# Patient Record
Sex: MALE | Race: WHITE | NOT HISPANIC OR LATINO | Employment: OTHER | URBAN - METROPOLITAN AREA
[De-identification: names, ages, dates, MRNs, and addresses within clinical notes are randomized per-mention and may not be internally consistent; named-entity substitution may affect disease eponyms.]

---

## 2021-09-03 ENCOUNTER — APPOINTMENT (EMERGENCY)
Dept: RADIOLOGY | Facility: HOSPITAL | Age: 71
DRG: 871 | End: 2021-09-03
Payer: MEDICARE

## 2021-09-03 ENCOUNTER — HOSPITAL ENCOUNTER (INPATIENT)
Facility: HOSPITAL | Age: 71
LOS: 4 days | Discharge: ANOTHER HOSPITAL | DRG: 871 | End: 2021-09-08
Attending: EMERGENCY MEDICINE | Admitting: INTERNAL MEDICINE
Payer: MEDICARE

## 2021-09-03 DIAGNOSIS — R93.1 ABNORMAL ECHOCARDIOGRAM: ICD-10-CM

## 2021-09-03 DIAGNOSIS — N18.9 CKD (CHRONIC KIDNEY DISEASE): ICD-10-CM

## 2021-09-03 DIAGNOSIS — J96.00 ACUTE RESPIRATORY FAILURE (HCC): Primary | ICD-10-CM

## 2021-09-03 DIAGNOSIS — R68.83 CHILLS: ICD-10-CM

## 2021-09-03 DIAGNOSIS — R50.9 FEVER: ICD-10-CM

## 2021-09-03 DIAGNOSIS — I50.9 CHF (CONGESTIVE HEART FAILURE) (HCC): ICD-10-CM

## 2021-09-03 DIAGNOSIS — R78.81 BACTEREMIA DUE TO GRAM-POSITIVE BACTERIA: ICD-10-CM

## 2021-09-03 DIAGNOSIS — R68.89 RIGORS: ICD-10-CM

## 2021-09-03 DIAGNOSIS — R06.00 DYSPNEA: ICD-10-CM

## 2021-09-03 PROBLEM — N17.9 ACUTE KIDNEY INJURY SUPERIMPOSED ON CHRONIC KIDNEY DISEASE (HCC): Status: ACTIVE | Noted: 2021-09-03

## 2021-09-03 PROBLEM — E87.6 HYPOKALEMIA: Status: ACTIVE | Noted: 2021-09-03

## 2021-09-03 PROBLEM — A41.9 SEPSIS (HCC): Status: ACTIVE | Noted: 2021-09-03

## 2021-09-03 PROBLEM — E11.9 TYPE 2 DIABETES MELLITUS (HCC): Status: ACTIVE | Noted: 2021-09-03

## 2021-09-03 PROBLEM — I10 HYPERTENSION: Status: ACTIVE | Noted: 2021-09-03

## 2021-09-03 PROBLEM — D64.9 ANEMIA: Status: ACTIVE | Noted: 2021-09-03

## 2021-09-03 LAB
ABO GROUP BLD: NORMAL
ABO GROUP BLD: NORMAL
ALBUMIN SERPL BCP-MCNC: 2.6 G/DL (ref 3.5–5)
ALP SERPL-CCNC: 84 U/L (ref 46–116)
ALT SERPL W P-5'-P-CCNC: 36 U/L (ref 12–78)
ANION GAP SERPL CALCULATED.3IONS-SCNC: 8 MMOL/L (ref 4–13)
APTT PPP: 46 SECONDS (ref 23–37)
AST SERPL W P-5'-P-CCNC: 34 U/L (ref 5–45)
BACTERIA UR QL AUTO: NORMAL /HPF
BASOPHILS # BLD AUTO: 0.03 THOUSANDS/ΜL (ref 0–0.1)
BASOPHILS NFR BLD AUTO: 0 % (ref 0–1)
BILIRUB DIRECT SERPL-MCNC: 0.13 MG/DL (ref 0–0.2)
BILIRUB SERPL-MCNC: 0.51 MG/DL (ref 0.2–1)
BILIRUB UR QL STRIP: NEGATIVE
BLD GP AB SCN SERPL QL: NEGATIVE
BUN SERPL-MCNC: 45 MG/DL (ref 5–25)
CALCIUM SERPL-MCNC: 8.4 MG/DL (ref 8.3–10.1)
CHLORIDE SERPL-SCNC: 97 MMOL/L (ref 100–108)
CLARITY UR: CLEAR
CO2 SERPL-SCNC: 31 MMOL/L (ref 21–32)
COLOR UR: YELLOW
CREAT SERPL-MCNC: 2.49 MG/DL (ref 0.6–1.3)
EOSINOPHIL # BLD AUTO: 0.01 THOUSAND/ΜL (ref 0–0.61)
EOSINOPHIL NFR BLD AUTO: 0 % (ref 0–6)
ERYTHROCYTE [DISTWIDTH] IN BLOOD BY AUTOMATED COUNT: 13.4 % (ref 11.6–15.1)
GFR SERPL CREATININE-BSD FRML MDRD: 25 ML/MIN/1.73SQ M
GLUCOSE SERPL-MCNC: 142 MG/DL (ref 65–140)
GLUCOSE SERPL-MCNC: 199 MG/DL (ref 65–140)
GLUCOSE UR STRIP-MCNC: ABNORMAL MG/DL
HCT VFR BLD AUTO: 30.4 % (ref 36.5–49.3)
HGB BLD-MCNC: 9.8 G/DL (ref 12–17)
HGB UR QL STRIP.AUTO: NEGATIVE
IMM GRANULOCYTES # BLD AUTO: 0.06 THOUSAND/UL (ref 0–0.2)
IMM GRANULOCYTES NFR BLD AUTO: 1 % (ref 0–2)
INR PPP: 1.75 (ref 0.84–1.19)
KETONES UR STRIP-MCNC: NEGATIVE MG/DL
LACTATE SERPL-SCNC: 1.4 MMOL/L (ref 0.5–2)
LACTATE SERPL-SCNC: 2.6 MMOL/L (ref 0.5–2)
LEUKOCYTE ESTERASE UR QL STRIP: ABNORMAL
LYMPHOCYTES # BLD AUTO: 0.3 THOUSANDS/ΜL (ref 0.6–4.47)
LYMPHOCYTES NFR BLD AUTO: 3 % (ref 14–44)
MCH RBC QN AUTO: 28.7 PG (ref 26.8–34.3)
MCHC RBC AUTO-ENTMCNC: 32.2 G/DL (ref 31.4–37.4)
MCV RBC AUTO: 89 FL (ref 82–98)
MONOCYTES # BLD AUTO: 0.89 THOUSAND/ΜL (ref 0.17–1.22)
MONOCYTES NFR BLD AUTO: 9 % (ref 4–12)
NEUTROPHILS # BLD AUTO: 8.32 THOUSANDS/ΜL (ref 1.85–7.62)
NEUTS SEG NFR BLD AUTO: 87 % (ref 43–75)
NITRITE UR QL STRIP: NEGATIVE
NON-SQ EPI CELLS URNS QL MICRO: NORMAL /HPF
NRBC BLD AUTO-RTO: 0 /100 WBCS
NT-PROBNP SERPL-MCNC: 3286 PG/ML
PH UR STRIP.AUTO: 5.5 [PH]
PLATELET # BLD AUTO: 182 THOUSANDS/UL (ref 149–390)
PMV BLD AUTO: 8.7 FL (ref 8.9–12.7)
POTASSIUM SERPL-SCNC: 3.1 MMOL/L (ref 3.5–5.3)
PROT SERPL-MCNC: 6.1 G/DL (ref 6.4–8.2)
PROT UR STRIP-MCNC: NEGATIVE MG/DL
PROTHROMBIN TIME: 19.6 SECONDS (ref 11.6–14.5)
RBC # BLD AUTO: 3.41 MILLION/UL (ref 3.88–5.62)
RBC #/AREA URNS AUTO: NORMAL /HPF
RH BLD: POSITIVE
RH BLD: POSITIVE
SARS-COV-2 RNA RESP QL NAA+PROBE: NEGATIVE
SODIUM SERPL-SCNC: 136 MMOL/L (ref 136–145)
SP GR UR STRIP.AUTO: 1.01 (ref 1–1.03)
SPECIMEN EXPIRATION DATE: NORMAL
TROPONIN I SERPL-MCNC: 0.03 NG/ML
UROBILINOGEN UR QL STRIP.AUTO: 0.2 E.U./DL
WBC # BLD AUTO: 9.61 THOUSAND/UL (ref 4.31–10.16)
WBC #/AREA URNS AUTO: NORMAL /HPF

## 2021-09-03 PROCEDURE — 83605 ASSAY OF LACTIC ACID: CPT | Performed by: EMERGENCY MEDICINE

## 2021-09-03 PROCEDURE — 99285 EMERGENCY DEPT VISIT HI MDM: CPT | Performed by: EMERGENCY MEDICINE

## 2021-09-03 PROCEDURE — 83880 ASSAY OF NATRIURETIC PEPTIDE: CPT | Performed by: EMERGENCY MEDICINE

## 2021-09-03 PROCEDURE — 1124F ACP DISCUSS-NO DSCNMKR DOCD: CPT | Performed by: EMERGENCY MEDICINE

## 2021-09-03 PROCEDURE — 99285 EMERGENCY DEPT VISIT HI MDM: CPT

## 2021-09-03 PROCEDURE — 87186 SC STD MICRODIL/AGAR DIL: CPT | Performed by: EMERGENCY MEDICINE

## 2021-09-03 PROCEDURE — 85730 THROMBOPLASTIN TIME PARTIAL: CPT | Performed by: EMERGENCY MEDICINE

## 2021-09-03 PROCEDURE — 93005 ELECTROCARDIOGRAM TRACING: CPT

## 2021-09-03 PROCEDURE — 85025 COMPLETE CBC W/AUTO DIFF WBC: CPT | Performed by: EMERGENCY MEDICINE

## 2021-09-03 PROCEDURE — 87040 BLOOD CULTURE FOR BACTERIA: CPT | Performed by: EMERGENCY MEDICINE

## 2021-09-03 PROCEDURE — 71045 X-RAY EXAM CHEST 1 VIEW: CPT

## 2021-09-03 PROCEDURE — 82948 REAGENT STRIP/BLOOD GLUCOSE: CPT

## 2021-09-03 PROCEDURE — 80076 HEPATIC FUNCTION PANEL: CPT | Performed by: EMERGENCY MEDICINE

## 2021-09-03 PROCEDURE — U0005 INFEC AGEN DETEC AMPLI PROBE: HCPCS | Performed by: EMERGENCY MEDICINE

## 2021-09-03 PROCEDURE — 96365 THER/PROPH/DIAG IV INF INIT: CPT

## 2021-09-03 PROCEDURE — 86900 BLOOD TYPING SEROLOGIC ABO: CPT | Performed by: EMERGENCY MEDICINE

## 2021-09-03 PROCEDURE — 87077 CULTURE AEROBIC IDENTIFY: CPT | Performed by: EMERGENCY MEDICINE

## 2021-09-03 PROCEDURE — 85610 PROTHROMBIN TIME: CPT | Performed by: EMERGENCY MEDICINE

## 2021-09-03 PROCEDURE — 99220 PR INITIAL OBSERVATION CARE/DAY 70 MINUTES: CPT

## 2021-09-03 PROCEDURE — 81001 URINALYSIS AUTO W/SCOPE: CPT | Performed by: EMERGENCY MEDICINE

## 2021-09-03 PROCEDURE — 86850 RBC ANTIBODY SCREEN: CPT | Performed by: EMERGENCY MEDICINE

## 2021-09-03 PROCEDURE — 80048 BASIC METABOLIC PNL TOTAL CA: CPT | Performed by: EMERGENCY MEDICINE

## 2021-09-03 PROCEDURE — 86901 BLOOD TYPING SEROLOGIC RH(D): CPT | Performed by: EMERGENCY MEDICINE

## 2021-09-03 PROCEDURE — U0003 INFECTIOUS AGENT DETECTION BY NUCLEIC ACID (DNA OR RNA); SEVERE ACUTE RESPIRATORY SYNDROME CORONAVIRUS 2 (SARS-COV-2) (CORONAVIRUS DISEASE [COVID-19]), AMPLIFIED PROBE TECHNIQUE, MAKING USE OF HIGH THROUGHPUT TECHNOLOGIES AS DESCRIBED BY CMS-2020-01-R: HCPCS | Performed by: EMERGENCY MEDICINE

## 2021-09-03 PROCEDURE — 84145 PROCALCITONIN (PCT): CPT

## 2021-09-03 PROCEDURE — 36415 COLL VENOUS BLD VENIPUNCTURE: CPT | Performed by: EMERGENCY MEDICINE

## 2021-09-03 PROCEDURE — 84484 ASSAY OF TROPONIN QUANT: CPT | Performed by: EMERGENCY MEDICINE

## 2021-09-03 PROCEDURE — 96367 TX/PROPH/DG ADDL SEQ IV INF: CPT

## 2021-09-03 RX ORDER — ONDANSETRON 2 MG/ML
4 INJECTION INTRAMUSCULAR; INTRAVENOUS EVERY 6 HOURS PRN
Status: DISCONTINUED | OUTPATIENT
Start: 2021-09-03 | End: 2021-09-09 | Stop reason: HOSPADM

## 2021-09-03 RX ORDER — AMLODIPINE BESYLATE 5 MG/1
5 TABLET ORAL DAILY
COMMUNITY
Start: 2021-08-31

## 2021-09-03 RX ORDER — PAROXETINE 10 MG/1
10 TABLET, FILM COATED ORAL
Status: ON HOLD | COMMUNITY
End: 2021-09-08 | Stop reason: SDUPTHER

## 2021-09-03 RX ORDER — AMLODIPINE BESYLATE 5 MG/1
5 TABLET ORAL DAILY
Status: DISCONTINUED | OUTPATIENT
Start: 2021-09-04 | End: 2021-09-09 | Stop reason: HOSPADM

## 2021-09-03 RX ORDER — METOPROLOL SUCCINATE 50 MG/1
50 TABLET, EXTENDED RELEASE ORAL DAILY
Status: DISCONTINUED | OUTPATIENT
Start: 2021-09-04 | End: 2021-09-04

## 2021-09-03 RX ORDER — TORSEMIDE 20 MG/1
40 TABLET ORAL DAILY
COMMUNITY
Start: 2021-08-31 | End: 2021-09-08 | Stop reason: HOSPADM

## 2021-09-03 RX ORDER — POTASSIUM CHLORIDE 20 MEQ/1
40 TABLET, EXTENDED RELEASE ORAL ONCE
Status: COMPLETED | OUTPATIENT
Start: 2021-09-03 | End: 2021-09-03

## 2021-09-03 RX ORDER — FUROSEMIDE 10 MG/ML
40 INJECTION INTRAMUSCULAR; INTRAVENOUS ONCE
Status: DISCONTINUED | OUTPATIENT
Start: 2021-09-03 | End: 2021-09-04

## 2021-09-03 RX ORDER — ACETAMINOPHEN 325 MG/1
650 TABLET ORAL EVERY 6 HOURS PRN
Status: DISCONTINUED | OUTPATIENT
Start: 2021-09-03 | End: 2021-09-09 | Stop reason: HOSPADM

## 2021-09-03 RX ORDER — METOPROLOL SUCCINATE 50 MG/1
50 TABLET, EXTENDED RELEASE ORAL DAILY
COMMUNITY
Start: 2021-06-14 | End: 2021-09-08 | Stop reason: HOSPADM

## 2021-09-03 RX ORDER — PAROXETINE HYDROCHLORIDE 20 MG/1
20 TABLET, FILM COATED ORAL DAILY
Status: DISCONTINUED | OUTPATIENT
Start: 2021-09-04 | End: 2021-09-09 | Stop reason: HOSPADM

## 2021-09-03 RX ADMIN — POTASSIUM CHLORIDE 40 MEQ: 1500 TABLET, EXTENDED RELEASE ORAL at 22:14

## 2021-09-03 RX ADMIN — APIXABAN 5 MG: 5 TABLET, FILM COATED ORAL at 22:14

## 2021-09-03 RX ADMIN — INSULIN LISPRO 2 UNITS: 100 INJECTION, SOLUTION INTRAVENOUS; SUBCUTANEOUS at 22:15

## 2021-09-03 RX ADMIN — VANCOMYCIN HYDROCHLORIDE 1500 MG: 1 INJECTION, POWDER, LYOPHILIZED, FOR SOLUTION INTRAVENOUS at 19:13

## 2021-09-03 RX ADMIN — CEFEPIME HYDROCHLORIDE 2000 MG: 2 INJECTION, POWDER, FOR SOLUTION INTRAVENOUS at 18:03

## 2021-09-03 NOTE — ED PROVIDER NOTES
History  Chief Complaint   Patient presents with    Shortness of Breath     As per EMS "patient c/o sob, weakness, fever, reports hospitalization 2 wks ago for CHF exacerbation"     74 yo male with h/o CHF and chronic systolic heart murmur who presents to the ED for evaluation of cough, weakness, chills and fever of 101 earlier today  Associated dyspnea  Denies chest pain  Denies abd pain  Denies urinary sxs  Denies HA and neck pain  Denies rash  Denies LE pain or swelling  On eliquis and torsemide  PMH also significant for DM and CKD  Fully vaccinated against covid  Prior to Admission Medications   Prescriptions Last Dose Informant Patient Reported? Taking? PARoxetine (PAXIL) 10 mg tablet   Yes No   Sig: Take 10 mg by mouth   amLODIPine (NORVASC) 5 mg tablet   Yes Yes   Sig: Take 5 mg by mouth daily   apixaban (ELIQUIS) 5 mg   Yes No   Sig: Take 5 mg by mouth 2 (two) times a day   metoprolol succinate (TOPROL-XL) 50 mg 24 hr tablet   Yes Yes   Sig: Take 50 mg by mouth daily   torsemide (DEMADEX) 20 mg tablet   Yes Yes   Sig: Take 40 mg by mouth daily      Facility-Administered Medications: None       Past Medical History:   Diagnosis Date    [facility]F (congestive heart failure) ([facility] [address] )     Diabetes mellitus ([facility] )     Hypertension        No past surgical history on file  No family history on file  I have reviewed and agree with the history as documented  E-Cigarette/Vaping    E-Cigarette Use Never User      E-Cigarette/Vaping Substances     Social History     Tobacco Use    Smoking status: Former Smoker     Types: Cigarettes   Vaping Use    Vaping Use: Never used   Substance Use Topics    Alcohol use: Not Currently    Drug use: Not on file       Review of Systems   Constitutional: Positive for chills and fever  Respiratory: Positive for shortness of breath  Neurological: Positive for weakness  All other systems reviewed and are negative        Physical Exam  Physical Exam  Vitals and nursing note reviewed  Constitutional:       General: He is not in acute distress  Appearance: Normal appearance  He is well-developed  He is not ill-appearing, toxic-appearing or diaphoretic  HENT:      Head: Normocephalic and atraumatic  Nose: Nose normal  No congestion  Eyes:      Conjunctiva/sclera: Conjunctivae normal       Pupils: Pupils are equal, round, and reactive to light  Neck:      Vascular: No JVD  Cardiovascular:      Rate and Rhythm: Normal rate and regular rhythm  Pulses: Normal pulses  Heart sounds: Murmur heard  No friction rub  No gallop  Pulmonary:      Effort: Pulmonary effort is normal  No respiratory distress  Breath sounds: Normal breath sounds  No stridor  No wheezing, rhonchi or rales  Abdominal:      General: There is no distension  Palpations: Abdomen is soft  Tenderness: There is no abdominal tenderness  Musculoskeletal:         General: No swelling, tenderness, deformity or signs of injury  Normal range of motion  Cervical back: Normal range of motion and neck supple  No rigidity or tenderness  Right lower leg: No edema  Left lower leg: No edema  Skin:     General: Skin is warm and dry  Capillary Refill: Capillary refill takes less than 2 seconds  Coloration: Skin is not jaundiced or pale  Findings: No bruising, erythema, lesion or rash  Neurological:      General: No focal deficit present  Mental Status: He is alert and oriented to person, place, and time  Cranial Nerves: No cranial nerve deficit  Sensory: No sensory deficit  Motor: No weakness or abnormal muscle tone        Coordination: Coordination normal          Vital Signs  ED Triage Vitals [09/03/21 1717]   Temperature Pulse Respirations Blood Pressure SpO2   99 4 °F (37 4 °C) 75 (!) 23 120/59 100 %      Temp Source Heart Rate Source Patient Position - Orthostatic VS BP Location FiO2 (%)   Oral Monitor -- Right arm -- Pain Score       --           Vitals:    09/03/21 1717 09/03/21 1800 09/03/21 1900   BP: 120/59 102/51 115/55   Pulse: 75 69 69         Visual Acuity      ED Medications  Medications   vancomycin (VANCOCIN) 1500 mg in sodium chloride 0 9% 250 mL IVPB (1,500 mg Intravenous New Bag 9/3/21 1913)   cefepime (MAXIPIME) 2 g/50 mL dextrose IVPB (0 mg Intravenous Stopped 9/3/21 1833)       Diagnostic Studies  Results Reviewed     Procedure Component Value Units Date/Time    Urine Microscopic [[id number]]  (Normal) Collected: 09/03/21 1913    Lab Status: Final result Specimen: Urine, Clean Catch Updated: 09/03/21 1939     RBC, UA 0-1 /hpf      WBC, UA 0-1 /hpf      Epithelial Cells None Seen /hpf      Bacteria, UA None Seen /hpf     UA (URINE) with reflex to Scope [[id number]]  (Abnormal) Collected: 09/03/21 1913    Lab Status: Final result Specimen: Urine, Clean Catch Updated: 09/03/21 1929     Color, UA Yellow     Clarity, UA Clear     Specific Gravity, UA 1 015     pH, UA 5 5     Leukocytes, UA Elevated glucose may cause decreased leukocyte values  See urine microscopic for [facility] result/     Nitrite, UA Negative     Protein, UA Negative mg/dl      Glucose, UA >=1000 (1%) mg/dl      Ketones, UA Negative mg/dl      Urobilinogen, UA 0 2 E U /dl      Bilirubin, UA Negative     Blood, UA Negative    Novel Coronavirus (Covid-19),PCR [facility] - 2 Hour Stat [[id number]]  (Normal) Collected: 09/03/21 1746    Lab Status: Final result Specimen: Nares from Nasopharyngeal Swab Updated: 09/03/21 1850     SARS-CoV-2 Negative    Narrative: The specimen collection materials, transport medium, and/or testing methodology utilized in the production of these test results have been proven to be reliable in a limited validation with an abbreviated program under the Emergency Utilization Authorization provided by the FDA  Testing reported as "Presumptive positive" will be confirmed with secondary testing to ensure result accuracy    Clinical caution and judgement should be used with the interpretation of these results with consideration of the clinical impression and other laboratory testing  Testing reported as "Positive" or "Negative" has been proven to be accurate according to standard laboratory validation requirements  All testing is performed with control materials showing appropriate reactivity at standard intervals  Lactic acid [[id number]]  (Abnormal) Collected: 09/03/21 1741    Lab Status: Final result Specimen: Blood from Arm, Left Updated: 09/03/21 1827     LACTIC ACID 2 6 mmol/L     Narrative:      Result may be elevated if tourniquet was used during collection      Lactic acid 2 Hours [[id number]]     Lab Status: No result Specimen: Blood     NT-BNP PRO [[id number]]  (Abnormal) Collected: 09/03/21 1741    Lab Status: Final result Specimen: Blood from Arm, Left Updated: 09/03/21 1823     NT-proBNP 3,286 pg/mL     Basic metabolic panel [[id number]]  (Abnormal) Collected: 09/03/21 1741    Lab Status: Final result Specimen: Blood from Arm, Left Updated: 09/03/21 1823     Sodium 136 mmol/L      Potassium 3 1 mmol/L      Chloride 97 mmol/L      CO2 31 mmol/L      ANION GAP 8 mmol/L      BUN 45 mg/dL      Creatinine 2 49 mg/dL      Glucose 142 mg/dL      Calcium 8 4 mg/dL      eGFR 25 ml/min/1 73sq m     Narrative:      [place] guidelines for Chronic Kidney Disease (CKD):     Stage 1 with normal or high GFR (GFR > 90 mL/min/1 73 square meters)    Stage 2 Mild CKD (GFR = 60-89 mL/min/1 73 square meters)    Stage 3A Moderate CKD (GFR = 45-59 mL/min/1 73 square meters)    Stage 3B Moderate CKD (GFR = 30-44 mL/min/1 73 square meters)    Stage 4 Severe CKD (GFR = 15-29 mL/min/1 73 square meters)    Stage 5 End Stage CKD (GFR <15 mL/min/1 73 square meters)  Note: GFR calculation is accurate only with a steady state creatinine    Hepatic function panel [[id number]]  (Abnormal) Collected: 09/03/21 1741    Lab Status: Final result Specimen: Blood from Arm, Left Updated: 09/03/21 1823     Total Bilirubin 0 51 mg/dL      Bilirubin, Direct 0 13 mg/dL      Alkaline Phosphatase 84 U/L      AST 34 U/L      ALT 36 U/L      Total Protein 6 1 g/dL      Albumin 2 6 g/dL     Protime-INR [[id number]]  (Abnormal) Collected: 09/03/21 1741    Lab Status: Final result Specimen: Blood from Arm, Left Updated: 09/03/21 1822     Protime 19 6 seconds      INR 1 75    APTT [[id number]]  (Abnormal) Collected: 09/03/21 1741    Lab Status: Final result Specimen: Blood from Arm, Left Updated: 09/03/21 1822     PTT 46 seconds     Troponin I [[id number]]  (Normal) Collected: 09/03/21 1741    Lab Status: Final result Specimen: Blood from Arm, Left Updated: 09/03/21 1811     Troponin I 0 03 ng/mL     CBC and differential [[id number]]  (Abnormal) Collected: 09/03/21 1741    Lab Status: Final result Specimen: Blood from Arm, Left Updated: 09/03/21 1753     WBC 9 61 Thousand/uL      RBC 3 41 Million/uL      Hemoglobin 9 8 g/dL      Hematocrit 30 4 %      MCV 89 fL      MCH 28 7 pg      MCHC 32 2 g/dL      RDW 13 4 %      MPV 8 7 fL      Platelets 427 Thousands/uL      nRBC 0 /100 WBCs      Neutrophils Relative 87 %      Immat GRANS % 1 %      Lymphocytes Relative 3 %      Monocytes Relative 9 %      Eosinophils Relative 0 %      Basophils Relative 0 %      Neutrophils Absolute 8 32 Thousands/µL      Immature Grans Absolute 0 06 Thousand/uL      Lymphocytes Absolute 0 30 Thousands/µL      Monocytes Absolute 0 89 Thousand/µL      Eosinophils Absolute 0 01 Thousand/µL      Basophils Absolute 0 03 Thousands/µL     Blood culture #2 [[id number]] Collected: 09/03/21 1741    Lab Status: In process Specimen: Blood from Arm, Left Updated: 09/03/21 1749    Blood culture #1 [[id number]] Collected: 09/03/21 1741    Lab Status:  In process Specimen: Blood from Arm, Right Updated: 09/03/21 1749                 XR chest 1 view portable    (Results Pending) Procedures  Procedures         ED Course  ED Course as of Sep 03 2003   Fri Sep 03, 2021   1842 Creat baseline based on prior bmp from OSH available in epic                                 SBIRT 20yo+      Most Recent Value   SBIRT (25 yo +)   In order to provide better care to our patients, we are screening all of our patients for alcohol and drug use  Would it be okay to ask you these screening questions? Yes Filed at: 09/03/2021 1754   Initial Alcohol Screen: US AUDIT-C    1  How often do you have a drink containing alcohol?  0 Filed at: 09/03/2021 1754   2  How many drinks containing alcohol do you have on a typical day you are drinking? 0 Filed at: 09/03/2021 1754   3a  Male UNDER 65: How often do you have five or more drinks on one occasion? 0 Filed at: 09/03/2021 1754   Audit-C Score  0 Filed at: 09/03/2021 1754   DAVIDE: How many times in the past year have you    Used an illegal drug or used a prescription medication for non-medical reasons?   Never Filed at: 09/03/2021 1754                    MDM  Number of Diagnoses or Management Options     Amount and/or Complexity of Data Reviewed  Clinical lab tests: reviewed and ordered  Tests in the radiology section of CPT®: reviewed and ordered  Tests in the medicine section of CPT®: ordered and reviewed  Independent visualization of images, tracings, or specimens: yes        Disposition  Final diagnoses:   Dyspnea   Fever   Chills   Rigors   CKD (chronic kidney disease)   CHF (congestive heart failure) ([facility] [address] )     Time reflects when diagnosis was documented in both MDM as applicable and the Disposition within this note     Time User Action Codes Description Comment    9/3/2021  7:56 PM Elnita Rather Add [R06 00] Dyspnea     9/3/2021  7:56 PM Elnita Rather Add [R50 9] Fever     9/3/2021  7:56 PM Eliverto Speed [R68 83] Chills     9/3/2021  7:56 PM Eliverto Speed [R68 89] Rigors     9/3/2021  7:56 PM Duffy, Sada Aver Add [N18 9] CKD (chronic kidney disease) 9/3/2021  7:56 PM Yunior Duffy Add [I50 9] CHF (congestive heart failure) [facility])       ED Disposition     ED Disposition Condition Date/Time Comment    Admit Stable Fri Sep 3, 2021  7:56 PM Case was discussed with Ailyn Lazaro and the patient's admission status was agreed to be Admission Status: observation status to the service of Dr Alpa Livingston   Follow-up Information    None         Patient's Medications   Discharge Prescriptions    No medications on file     No discharge procedures on file      PDMP Review     None          ED Provider  Electronically Signed by           Myrl Jeans, MD  09/03/21 2003

## 2021-09-03 NOTE — ED NOTES
[facility] [address] [facility]  09/03/21 6983

## 2021-09-04 ENCOUNTER — APPOINTMENT (OUTPATIENT)
Dept: RADIOLOGY | Facility: HOSPITAL | Age: 71
DRG: 871 | End: 2021-09-04
Payer: MEDICARE

## 2021-09-04 LAB
ANION GAP SERPL CALCULATED.3IONS-SCNC: 6 MMOL/L (ref 4–13)
BASOPHILS # BLD MANUAL: 0 THOUSAND/UL (ref 0–0.1)
BASOPHILS NFR MAR MANUAL: 0 % (ref 0–1)
BUN SERPL-MCNC: 44 MG/DL (ref 5–25)
CALCIUM SERPL-MCNC: 7.9 MG/DL (ref 8.3–10.1)
CHLORIDE SERPL-SCNC: 97 MMOL/L (ref 100–108)
CO2 SERPL-SCNC: 30 MMOL/L (ref 21–32)
CREAT SERPL-MCNC: 2.46 MG/DL (ref 0.6–1.3)
EOSINOPHIL # BLD MANUAL: 0 THOUSAND/UL (ref 0–0.4)
EOSINOPHIL NFR BLD MANUAL: 0 % (ref 0–6)
ERYTHROCYTE [DISTWIDTH] IN BLOOD BY AUTOMATED COUNT: 13.5 % (ref 11.6–15.1)
GFR SERPL CREATININE-BSD FRML MDRD: 26 ML/MIN/1.73SQ M
GLUCOSE P FAST SERPL-MCNC: 228 MG/DL (ref 65–99)
GLUCOSE SERPL-MCNC: 228 MG/DL (ref 65–140)
GLUCOSE SERPL-MCNC: 244 MG/DL (ref 65–140)
GLUCOSE SERPL-MCNC: 261 MG/DL (ref 65–140)
GLUCOSE SERPL-MCNC: 336 MG/DL (ref 65–140)
GLUCOSE SERPL-MCNC: 369 MG/DL (ref 65–140)
HCT VFR BLD AUTO: 28.3 % (ref 36.5–49.3)
HGB BLD-MCNC: 9.3 G/DL (ref 12–17)
LYMPHOCYTES # BLD AUTO: 0.3 THOUSAND/UL (ref 0.6–4.47)
LYMPHOCYTES # BLD AUTO: 3 % (ref 14–44)
MCH RBC QN AUTO: 29.3 PG (ref 26.8–34.3)
MCHC RBC AUTO-ENTMCNC: 32.9 G/DL (ref 31.4–37.4)
MCV RBC AUTO: 89 FL (ref 82–98)
MONOCYTES # BLD AUTO: 0.51 THOUSAND/UL (ref 0–1.22)
MONOCYTES NFR BLD: 5 % (ref 4–12)
NEUTROPHILS # BLD MANUAL: 9.34 THOUSAND/UL (ref 1.85–7.62)
NEUTS BAND NFR BLD MANUAL: 2 % (ref 0–8)
NEUTS SEG NFR BLD AUTO: 90 % (ref 43–75)
PLATELET # BLD AUTO: 160 THOUSANDS/UL (ref 149–390)
PLATELET BLD QL SMEAR: ADEQUATE
PMV BLD AUTO: 8.7 FL (ref 8.9–12.7)
POTASSIUM SERPL-SCNC: 3.7 MMOL/L (ref 3.5–5.3)
PROCALCITONIN SERPL-MCNC: 2.25 NG/ML
RBC # BLD AUTO: 3.17 MILLION/UL (ref 3.88–5.62)
SODIUM SERPL-SCNC: 133 MMOL/L (ref 136–145)
WBC # BLD AUTO: 10.15 THOUSAND/UL (ref 4.31–10.16)

## 2021-09-04 PROCEDURE — 85007 BL SMEAR W/DIFF WBC COUNT: CPT

## 2021-09-04 PROCEDURE — 85027 COMPLETE CBC AUTOMATED: CPT

## 2021-09-04 PROCEDURE — 87081 CULTURE SCREEN ONLY: CPT | Performed by: INTERNAL MEDICINE

## 2021-09-04 PROCEDURE — 80048 BASIC METABOLIC PNL TOTAL CA: CPT

## 2021-09-04 PROCEDURE — 71046 X-RAY EXAM CHEST 2 VIEWS: CPT

## 2021-09-04 PROCEDURE — 99232 SBSQ HOSP IP/OBS MODERATE 35: CPT | Performed by: INTERNAL MEDICINE

## 2021-09-04 PROCEDURE — 82948 REAGENT STRIP/BLOOD GLUCOSE: CPT

## 2021-09-04 RX ORDER — AMOXICILLIN 250 MG
1 CAPSULE ORAL
Status: DISCONTINUED | OUTPATIENT
Start: 2021-09-04 | End: 2021-09-09 | Stop reason: HOSPADM

## 2021-09-04 RX ORDER — FUROSEMIDE 10 MG/ML
40 INJECTION INTRAMUSCULAR; INTRAVENOUS
Status: DISCONTINUED | OUTPATIENT
Start: 2021-09-04 | End: 2021-09-07

## 2021-09-04 RX ORDER — INSULIN GLARGINE 100 [IU]/ML
25 INJECTION, SOLUTION SUBCUTANEOUS
Status: DISCONTINUED | OUTPATIENT
Start: 2021-09-04 | End: 2021-09-05

## 2021-09-04 RX ADMIN — FUROSEMIDE 40 MG: 10 INJECTION, SOLUTION INTRAMUSCULAR; INTRAVENOUS at 08:55

## 2021-09-04 RX ADMIN — DOCUSATE SODIUM AND SENNOSIDES 1 TABLET: 8.6; 5 TABLET, FILM COATED ORAL at 16:29

## 2021-09-04 RX ADMIN — CEFEPIME HYDROCHLORIDE 1000 MG: 2 INJECTION, POWDER, FOR SOLUTION INTRAVENOUS at 05:56

## 2021-09-04 RX ADMIN — INSULIN GLARGINE 25 UNITS: 100 INJECTION, SOLUTION SUBCUTANEOUS at 21:32

## 2021-09-04 RX ADMIN — INSULIN LISPRO 5 UNITS: 100 INJECTION, SOLUTION INTRAVENOUS; SUBCUTANEOUS at 11:42

## 2021-09-04 RX ADMIN — Medication 12.5 MG: at 21:31

## 2021-09-04 RX ADMIN — Medication 12.5 MG: at 11:42

## 2021-09-04 RX ADMIN — INSULIN LISPRO 20 UNITS: 100 INJECTION, SOLUTION INTRAVENOUS; SUBCUTANEOUS at 16:40

## 2021-09-04 RX ADMIN — CEFEPIME HYDROCHLORIDE 1000 MG: 2 INJECTION, POWDER, FOR SOLUTION INTRAVENOUS at 18:21

## 2021-09-04 RX ADMIN — INSULIN LISPRO 6 UNITS: 100 INJECTION, SOLUTION INTRAVENOUS; SUBCUTANEOUS at 16:39

## 2021-09-04 RX ADMIN — PAROXETINE 20 MG: 20 TABLET, FILM COATED ORAL at 08:54

## 2021-09-04 RX ADMIN — VANCOMYCIN HYDROCHLORIDE 1500 MG: 1 INJECTION, POWDER, LYOPHILIZED, FOR SOLUTION INTRAVENOUS at 19:11

## 2021-09-04 RX ADMIN — INSULIN HUMAN 10 UNITS: 100 INJECTION, SOLUTION PARENTERAL at 16:38

## 2021-09-04 RX ADMIN — INSULIN LISPRO 3 UNITS: 100 INJECTION, SOLUTION INTRAVENOUS; SUBCUTANEOUS at 22:30

## 2021-09-04 RX ADMIN — DOCUSATE SODIUM AND SENNOSIDES 1 TABLET: 8.6; 5 TABLET, FILM COATED ORAL at 21:31

## 2021-09-04 RX ADMIN — APIXABAN 5 MG: 5 TABLET, FILM COATED ORAL at 18:21

## 2021-09-04 RX ADMIN — AMLODIPINE BESYLATE 5 MG: 5 TABLET ORAL at 08:55

## 2021-09-04 RX ADMIN — APIXABAN 5 MG: 5 TABLET, FILM COATED ORAL at 08:55

## 2021-09-04 RX ADMIN — INSULIN LISPRO 3 UNITS: 100 INJECTION, SOLUTION INTRAVENOUS; SUBCUTANEOUS at 07:45

## 2021-09-04 RX ADMIN — FUROSEMIDE 40 MG: 10 INJECTION, SOLUTION INTRAMUSCULAR; INTRAVENOUS at 16:29

## 2021-09-04 NOTE — ASSESSMENT & PLAN NOTE
No results found for: HGBA1C    No results for input(s): POCGLU in the last 72 hours      · Blood glucose 142 on admission  · A1c 7 5 on 8/21  · Patient not prescribed outpatient diabetic medications  · Mealtime SSI ordered  · Monitor blood glucose   · Hypoglycemia protocol  · Outpatient PCP follow up

## 2021-09-04 NOTE — PROGRESS NOTES
Vancomycin Assessment    Hudson Pfeiffer is a 73 y o  male who is currently receiving vancomycin 1500 mg iv once (given) the 1500 mg iv q 24 hours for Pneumonia     Relevant clinical data and objective history reviewed:  Creatinine   Date Value Ref Range Status   09/03/2021 2 49 (H) 0 60 - 1 30 mg/dL Final     Comment:     Standardized to IDMS reference method     BP (!) 109/49   Pulse 76   Temp 100 °F (37 8 °C)   Resp 20   Ht 5' 5" (1 651 m)   Wt 103 kg (227 lb 4 7 oz)   SpO2 94%   BMI 37 82 kg/m²   No intake/output data recorded  Lab Results   Component Value Date/Time    BUN 45 (H) 09/03/2021 05:41 PM    WBC 9 61 09/03/2021 05:41 PM    HGB 9 8 (L) 09/03/2021 05:41 PM    HCT 30 4 (L) 09/03/2021 05:41 PM    MCV 89 09/03/2021 05:41 PM     09/03/2021 05:41 PM     Temp Readings from Last 3 Encounters:   09/03/21 100 °F (37 8 °C)     Vancomycin Days of Therapy: 1    Assessment/Plan  The patient is currently on vancomycin utilizing scheduled dosing based on adjusted body weight (due to obesity)  Baseline risks associated with therapy include: pre-existing renal impairment, concomitant nephrotoxic medications, advanced age, and dehydration  The patient is currently receiving 1500 mg iv once (given) the 1500 mg iv q 24 hours and after clinical evaluation will be changed to    Pharmacy will also follow closely for s/sx of nephrotoxicity, infusion reactions, and appropriateness of therapy  BMP and CBC will be ordered per protocol  Plan for trough as patient approaches steady state, prior to the 4th  dose at approximately 17:30 on 9/06/2021  Due to infection severity, will target a trough of 15-20 (appropriate for most indications)   Pharmacy will continue to follow the patients culture results and clinical progress daily      Victor Manuel Castano, Pharmacist

## 2021-09-04 NOTE — ASSESSMENT & PLAN NOTE
No results found for: HGBA1C    Recent Labs     09/03/21  2203 09/04/21  0705 09/04/21  1051 09/04/21  1624   POCGLU 199* 244* 336* 369*     (P) 287  · Blood glucose 142 on admission  · A1c 7 5 on 8/21  · Patient not prescribed outpatient diabetic medications  · Mealtime SSI ordered  · Monitor blood glucose   · Hypoglycemia protocol  · Outpatient PCP follow up      9/4/21: reviewed home meds with family, patient is on Lantus 25 units b i d   And he is also on mealtime insulin 25-30 units depending on his blood sugar    -continue Lantus 25 b i d   Mealtime on 25 units  -sliding scale

## 2021-09-04 NOTE — ASSESSMENT & PLAN NOTE
Patient reporting development of cough, weakness, chills, dyspnea, decreased appetite, nausea without vomiting, and fever of 101F at home today  No recent sick contacts, patient is fully vaccinated against COVID  · Sepsis criteria;  Fever 101F measured at home, resp 24cpm in ED; chills, rigors, and SOB at home plus CBC left shift suggestive of possible infectious lung process superimposed on volume overload  · CXR BL lower lobe infiltrates   · 96% O2 1L NC, /49; monitor  · Lactic acid 2 6  · Troponin WNL  · Infectious workup and abx ordered in ED  · Procalcitonin ordered   · Continue cefepime and vancomycin  · Given suggestive acute CHF exacerbation, stable BP, with lactic acid less than 4; holding IV fluids in setting of acute CHF  · Monitor BP and consider IVF infusion if needed for hypotension

## 2021-09-04 NOTE — ASSESSMENT & PLAN NOTE
Wt Readings from Last 3 Encounters:   09/04/21 103 kg (227 lb 1 2 oz)       · CXR BL lower lobe infiltrates and BNP 3286 suggestive of volume overload  · Echo ordered   · 40mg IV Lasix ordered  · I/O's / daily weights  · Reevaluate need for further diuretics     9/4/21:  Patient with crackles at lung bases  Will continue IV Lasix 40 mg b i d   And assess response   -echo pending  -repeat cxr pa and lateral pending

## 2021-09-04 NOTE — ASSESSMENT & PLAN NOTE
· HGB 9 8; type and screen obtained in ED  · No signs of active bleeding; likely related to CKD  · Continue to monitor for s/s of active bleeding  · Transfuse if necessary   · AM CBC ordered

## 2021-09-04 NOTE — RESPIRATORY THERAPY NOTE
RT Protocol Note  Syed Gill 73 y o  male MRN: [id number]  Unit/Bed#: -01 Encounter: [id number]    Assessment    Principal Problem:    Sepsis ([facility] [address] )  Active Problems:    Anemia    Type 2 diabetes mellitus (HCC)    Acute CHF (congestive heart failure) (HCC)    Hypertension    Acute kidney injury superimposed on chronic kidney disease (HCC)    Hypokalemia      Home Pulmonary Medications:  none       Past Medical History:   Diagnosis Date    CHF (congestive heart failure) (HCC)     Diabetes mellitus ([facility] [address] )     Hypertension      Social History     Socioeconomic History    Marital status: /Civil Union     Spouse name: Not on file    Number of children: Not on file    Years of education: Not on file    Highest education level: Not on file   Occupational History    Not on file   Tobacco Use    Smoking status: Former Smoker     Types: Cigarettes   Vaping Use    Vaping Use: Never used   Substance and Sexual Activity    Alcohol use: Not Currently    Drug use: Not on file    Sexual activity: Not on file   Other Topics Concern    Not on file   Social History Narrative    Not on file     Social Determinants of Health     Financial Resource Strain:     Difficulty of Paying Living Expenses:    Food Insecurity:     Worried About Running Out of Food in the Last Year:     [address] in the Last Year:    Transportation Needs:     Lack of Transportation (Medical):      Lack of Transportation (Non-Medical):    Physical Activity:     Days of Exercise per Week:     Minutes of Exercise per Session:    Stress:     Feeling of Stress :    Social Connections:     Frequency of Communication with Friends and Family:     Frequency of Social Gatherings with Friends and Family:     Attends [employer] Services:     Active Member of Clubs or Organizations:     Attends Club or Organization Meetings:     Marital Status:    Intimate Partner Violence:     Fear of Current or Ex-Partner:     Emotionally Abused:     Physically Abused:     Sexually Abused:        Subjective         Objective    Physical Exam:   Assessment Type: Assess only  General Appearance: Drowsy  Respiratory Pattern: Spontaneous, Tachypneic  Chest Assessment: Chest expansion symmetrical  Bilateral Breath Sounds: Diminished  O2 Device: nasal cannula    Vitals:  Blood pressure (!) 127/46, pulse 69, temperature 99 3 °F (37 4 °C), resp  rate (!) 24, height 5' 5" (1 651 m), weight 103 kg (227 lb 4 7 oz), SpO2 93 %            Imaging and other studies:     O2 Device: nasal cannula     Plan    Respiratory Plan: No distress/Pulmonary history, found patient resting quietly in bed, bilat diminished bs although an UAW/audible whz is noted, HR 69 RR 24 Spo2 93% on 2L o2 nasal cannula, patient has no respiratory history nor home respiratory medications, no additional therapy indicated at this time

## 2021-09-04 NOTE — ASSESSMENT & PLAN NOTE
Lab Results   Component Value Date    EGFR 26 09/04/2021    EGFR 25 09/03/2021    CREATININE 2 46 (H) 09/04/2021    CREATININE 2 49 (H) 09/03/2021     · Creatinine on admission 2 49; baseline around 2 10  · Likely in setting of CHF volume overload vs prerenal SEPSIS  · Giving 40mg IV lasix; Monitor BP, AM BMP ordered, evaluate in AM for need of further diuretic vs IVF administration      9/4/21: kidney function is stable, likely component of cardiorenal, will continue to follow

## 2021-09-04 NOTE — ASSESSMENT & PLAN NOTE
· HGB 9 8; type and screen obtained in ED  · No signs of active bleeding; likely related to CKD  · Continue to monitor for s/s of active bleeding  · Transfuse if necessary   · AM CBC ordered     9/4/21: hgb relatively stable at 9 3, patient did receive fluids as well

## 2021-09-04 NOTE — H&P
[address] [facility]  H&P- Poornima Garibay 1950, 73 y o  male MRN: [id number]  Unit/Bed#: -Trav Encounter: [id number]  Primary Care Provider: No primary care provider on file  Date and time admitted to hospital: 9/3/2021  5:14 PM    * Sepsis [facility])  Assessment & Plan  Patient reporting development of cough, weakness, chills, dyspnea, decreased appetite, nausea without vomiting, and fever of 101F at home today  No recent sick contacts, patient is fully vaccinated against COVID  · Sepsis criteria; Fever 101F measured at home, resp 24cpm in ED; chills, rigors, and SOB at home plus CBC left shift suggestive of possible infectious lung process superimposed on volume overload  · CXR BL lower lobe infiltrates   · 96% O2 1L NC, /49; monitor  · Lactic acid 2 6  · Troponin WNL  · Infectious workup and abx ordered in ED  · Procalcitonin ordered   · Continue cefepime and vancomycin  · Given suggestive acute CHF exacerbation, stable BP, with lactic acid less than 4; holding IV fluids in setting of acute CHF  · Monitor BP and consider IVF infusion if needed for hypotension    Acute CHF (congestive heart failure) ([facility] [address] )  Assessment & Plan  Wt Readings from Last 3 Encounters:   09/03/21 103 kg (227 lb 4 7 oz)       · CXR BL lower lobe infiltrates and BNP 3286 suggestive of volume overload  · Echo ordered   · 40mg IV Lasix ordered  · I/O's / daily weights  · Reevaluate need for further diuretics         Acute kidney injury superimposed on chronic kidney disease ([facility] [address] )  Assessment & Plan  Lab Results   Component Value Date    EGFR 25 09/03/2021    CREATININE 2 49 (H) 09/03/2021     · Creatinine on admission 2 49; baseline around 2 10  · Likely in setting of CHF volume overload vs prerenal SEPSIS  · Giving 40mg IV lasix;  Monitor BP, AM BMP ordered, evaluate in AM for need of further diuretic vs IVF administration    Anemia  Assessment & Plan  · HGB 9 8; type and screen obtained in ED  · No signs of active bleeding; likely related to CKD  · Continue to monitor for s/s of active bleeding  · Transfuse if necessary   · AM CBC ordered     Hypokalemia  Assessment & Plan  · Potassium 3 1  · 40meq PO given  · Consider tele monitoring of patient drops below 3 0  · Recheck BMP in AM and replete as needed    Type 2 diabetes mellitus ([facility] )  Assessment & Plan  No results found for: HGBA1C    No results for input(s): POCGLU in the last 72 hours  · Blood glucose 142 on admission  · A1c 7 5 on 8/21  · Patient not prescribed outpatient diabetic medications  · Mealtime SSI ordered  · Monitor blood glucose   · Hypoglycemia protocol  · Outpatient PCP follow up    Hypertension  Assessment & Plan  · Latest /49  · Continue home HTN medications  · Holding IVF in setting of acute CHF; consider administration if hypotensive and lungs are clear  · Monitor BP in setting of SEPSIS      VTE Pharmacologic Prophylaxis: VTE Score: 6 High Risk (Score >/= 5) - Pharmacological DVT Prophylaxis Ordered: heparin  Sequential Compression Devices Ordered  Code Status: Level 1 - Full Code   Discussion with family: Update contact in the AM      Anticipated Length of Stay: Patient will be admitted on an observation basis with an anticipated length of stay of less than 2 midnights secondary to SEPSIS, acute CHF exacerbation, and hypokalemia   Total Time for Visit, including Counseling / Coordination of Care: 70 minutes Greater than 50% of this total time spent on direct patient counseling and coordination of care  Chief Complaint: cough, difficulty breathing, fever, chills, and fatigue at home today    History of Present Illness:  Poornima Garibay is a 73 y o  male with a PMH of CHF, type 2 DM, HTN, and CKD who presents with cough, difficulty breathing, fever, chills, and fatigue at home today  Patient reporting development of cough, weakness, chills, decreased appetite, nausea without vomiting, and fever of 101F at home today   No recent sick contacts, patient is fully vaccinated against COVID  All questions answered at the bedside to the patient's satisfactions  Review of Systems:  Review of Systems   Constitutional: Positive for activity change, appetite change, chills, fatigue and fever  Negative for diaphoresis  HENT: Negative for congestion, ear pain, nosebleeds and trouble swallowing  Eyes: Negative for pain and visual disturbance  Respiratory: Positive for cough and shortness of breath  Negative for apnea, chest tightness and wheezing  Cardiovascular: Positive for leg swelling  Negative for chest pain and palpitations  Gastrointestinal: Positive for nausea  Negative for abdominal distention, abdominal pain, blood in stool, constipation, diarrhea and vomiting  Endocrine: Negative for cold intolerance, heat intolerance and polyuria  Genitourinary: Negative for difficulty urinating, dysuria, flank pain and hematuria  Musculoskeletal: Negative for arthralgias, neck pain and neck stiffness  Skin: Negative for color change, rash and wound  Neurological: Positive for weakness  Negative for dizziness, tremors, syncope, light-headedness, numbness and headaches  Hematological: Negative for adenopathy  All other systems reviewed and are negative  Past Medical and Surgical History:   Past Medical History:   Diagnosis Date    CHF (congestive heart failure) ([facility] )     Diabetes mellitus ([facility] )     Hypertension        No past surgical history on file  Meds/Allergies:  Prior to Admission medications    Medication Sig Start Date End Date Taking?  Authorizing Provider   amLODIPine (NORVASC) 5 mg tablet Take 5 mg by mouth daily 8/31/21  Yes Historical Provider, MD   metoprolol succinate (TOPROL-XL) 50 mg 24 hr tablet Take 50 mg by mouth daily 6/14/21  Yes Historical Provider, MD   torsemide (DEMADEX) 20 mg tablet Take 40 mg by mouth daily 8/31/21  Yes Historical Provider, MD   apixaban (ELIQUIS) 5 mg Take 5 mg by mouth 2 (two) times a day    Historical Provider, MD   PARoxetine (PAXIL) 10 mg tablet Take 10 mg by mouth    Historical Provider, MD     I have reviewed home medications with patient personally  Allergies: Allergies   Allergen Reactions    Nitroglycerin Other (See Comments)     Patient states "he is unsure of reaction"    Statins Other (See Comments)     Patient states "he is unsure of reaction"       Social History:  Marital Status: /Civil Union   Occupation: N/A  Patient Pre-hospital Living Situation: Home  Patient Pre-hospital Level of Mobility: walks  Patient Pre-hospital Diet Restrictions: None  Substance Use History:   Social History     Substance and Sexual Activity   Alcohol Use Not Currently     Social History     Tobacco Use   Smoking Status Former Smoker    Types: Cigarettes     Social History     Substance and Sexual Activity   Drug Use Not on file       Family History:  No family history on file  Physical Exam:     Vitals:   Blood Pressure: (!) 127/46 (09/03/21 2301)  Pulse: 85 (09/03/21 2301)  Temperature: 99 3 °F (37 4 °C) (09/03/21 2301)  Temp Source: Oral (09/03/21 1717)  Respirations: 14 (09/03/21 2301)  Height: 5' 5" (165 1 cm) (09/03/21 1717)  Weight - Scale: 103 kg (227 lb 4 7 oz) (09/03/21 1717)  SpO2: (!) 89 % (09/03/21 2301)    Physical Exam  Vitals and nursing note reviewed  Constitutional:       General: He is not in acute distress  Appearance: Normal appearance  HENT:      Head: Normocephalic and atraumatic  Right Ear: External ear normal       Left Ear: External ear normal       Nose: Nose normal       Mouth/Throat:      Mouth: Mucous membranes are moist    Eyes:      Pupils: Pupils are equal, round, and reactive to light  Cardiovascular:      Rate and Rhythm: Normal rate and regular rhythm  Pulses: Normal pulses  Heart sounds: Murmur heard  Pulmonary:      Effort: Pulmonary effort is normal  No respiratory distress        Breath sounds: Examination of the right-lower field reveals decreased breath sounds  Examination of the left-lower field reveals decreased breath sounds  Decreased breath sounds present  No wheezing or rales  Chest:      Chest wall: No tenderness  Abdominal:      General: Bowel sounds are normal  There is no distension  Palpations: Abdomen is soft  There is no mass  Tenderness: There is no abdominal tenderness  There is no guarding  Musculoskeletal:         General: No swelling or tenderness  Cervical back: Normal range of motion and neck supple  No rigidity or tenderness  Right lower leg: No edema  Left lower leg: No edema  Skin:     General: Skin is warm and dry  Capillary Refill: Capillary refill takes less than 2 seconds  Findings: No lesion or rash  Neurological:      General: No focal deficit present  Mental Status: He is alert     Psychiatric:         Mood and Affect: Mood normal           Additional Data:     Lab Results:  Results from last 7 days   Lab Units 09/03/21  1741   WBC Thousand/uL 9 61   HEMOGLOBIN g/dL 9 8*   HEMATOCRIT % 30 4*   PLATELETS Thousands/uL 182   NEUTROS PCT % 87*   LYMPHS PCT % 3*   MONOS PCT % 9   EOS PCT % 0     Results from last 7 days   Lab Units 09/03/21  1741   SODIUM mmol/L 136   POTASSIUM mmol/L 3 1*   CHLORIDE mmol/L 97*   CO2 mmol/L 31   BUN mg/dL 45*   CREATININE mg/dL 2 49*   ANION GAP mmol/L 8   CALCIUM mg/dL 8 4   ALBUMIN g/dL 2 6*   TOTAL BILIRUBIN mg/dL 0 51   ALK PHOS U/L 84   ALT U/L 36   AST U/L 34   GLUCOSE RANDOM mg/dL 142*     Results from last 7 days   Lab Units 09/03/21  1741   INR  1 75*     Results from last 7 days   Lab Units 09/03/21  2203   POC GLUCOSE mg/dl 199*         Results from last 7 days   Lab Units 09/03/21  2015 09/03/21  1741   LACTIC ACID mmol/L 1 4 2 6*       Imaging: Personally reviewed the following imaging: chest xray  XR chest 1 view portable    (Results Pending)       EKG and Other Studies Reviewed on Admission:   · EKG: No EKG obtained  ** Please Note: This note has been constructed using a voice recognition system   **

## 2021-09-04 NOTE — PLAN OF CARE
Problem: Potential for Falls  Goal: Patient will remain free of falls  Description: INTERVENTIONS:  - Educate patient/family on patient safety including physical limitations  - Instruct patient to call for assistance with activity   - Consult OT/PT to assist with strengthening/mobility   - Keep Call bell within reach  - Keep bed low and locked with side rails adjusted as appropriate  - Keep care items and personal belongings within reach  - Initiate and maintain comfort rounds  - Make Fall Risk Sign visible to staff  - Offer Toileting every Hours, in advance of need  - Initiate/Maintain alarm  - Obtain necessary fall risk management equipment:   - Apply yellow socks and bracelet for high fall risk patients  - Consider moving patient to room near nurses station  Outcome: Progressing     Problem: MOBILITY - ADULT  Goal: Maintain or return to baseline ADL function  Description: INTERVENTIONS:  -  Assess patient's ability to carry out ADLs; assess patient's baseline for ADL function and identify physical deficits which impact ability to perform ADLs (bathing, care of mouth/teeth, toileting, grooming, dressing, etc )  - Assess/evaluate cause of self-care deficits   - Assess range of motion  - Assess patient's mobility; develop plan if impaired  - Assess patient's need for assistive devices and provide as appropriate  - Encourage maximum independence but intervene and supervise when necessary  - Involve family in performance of ADLs  - Assess for home care needs following discharge   - Consider OT consult to assist with ADL evaluation and planning for discharge  - Provide patient education as appropriate  Outcome: Progressing  Goal: Maintains/Returns to pre admission functional level  Description: INTERVENTIONS:  - Perform BMAT or MOVE assessment daily    - Set and communicate daily mobility goal to care team and patient/family/caregiver     - Collaborate with rehabilitation services on mobility goals if consulted  - Perform Range of Motion times a day  - Reposition patient every hours    - Dangle patient times a day  - Stand patient times a day  - Ambulate patient times a day  - Out of bed to chair times a day   - Out of bed for meals times a day  - Out of bed for toileting  - Record patient progress and toleration of activity level   Outcome: Progressing     Problem: Prexisting or High Potential for Compromised Skin Integrity  Goal: Skin integrity is maintained or improved  Description: INTERVENTIONS:  - Identify patients at risk for skin breakdown  - Assess and monitor skin integrity  - Assess and monitor nutrition and hydration status  - Monitor labs   - Assess for incontinence   - Turn and reposition patient  - Assist with mobility/ambulation  - Relieve pressure over bony prominences  - Avoid friction and shearing  - Provide appropriate hygiene as needed including keeping skin clean and dry  - Evaluate need for skin moisturizer/barrier cream  - Collaborate with interdisciplinary team   - Patient/family teaching  - Consider wound care consult   Outcome: Progressing

## 2021-09-04 NOTE — ASSESSMENT & PLAN NOTE
· Potassium 3 1  · 40meq PO given  · Consider tele monitoring of patient drops below 3 0  · Recheck BMP in AM and replete as needed

## 2021-09-04 NOTE — ASSESSMENT & PLAN NOTE
Lab Results   Component Value Date    EGFR 25 09/03/2021    CREATININE 2 49 (H) 09/03/2021     · Creatinine on admission 2 49; baseline around 2 10  · Likely in setting of CHF volume overload vs prerenal SEPSIS  · Giving 40mg IV lasix;  Monitor BP, AM BMP ordered, evaluate in AM for need of further diuretic vs IVF administration

## 2021-09-04 NOTE — ASSESSMENT & PLAN NOTE
Patient reporting development of cough, weakness, chills, dyspnea, decreased appetite, nausea without vomiting, and fever of 101F at home today  No recent sick contacts, patient is fully vaccinated against COVID  · Sepsis criteria; Fever 101F measured at home, resp 24cpm in ED; chills, rigors, and SOB at home plus CBC left shift suggestive of possible infectious lung process superimposed on volume overload  · CXR BL lower lobe infiltrates   · 96% O2 1L NC, /49; monitor  · Lactic acid 2 6  · Troponin WNL  · Infectious workup and abx ordered in ED  · Procalcitonin ordered   · Continue cefepime and vancomycin  · Given suggestive acute CHF exacerbation, stable BP, with lactic acid less than 4; holding IV fluids in setting of acute CHF  · Monitor BP and consider IVF infusion if needed for hypotension      9/4/21: patient examined this morning  He is still requiring 2-3L NC when he ambulates, at rest he seems to be ok on room air  He has been afebrile over the past 24 hours  He states he is very short of breath when ambulating    -noted 2/2 positive blood cultures for gram positive cocci in pairs and chains  -echo pending  -may need ID consult depending on the speciation of this organsim

## 2021-09-04 NOTE — PROGRESS NOTES
Vancomycin IV Pharmacy-to-Dose Consultation    Hudson Pfeiffer is a 73 y o  male who is currently receiving Vancomycin IV with management by the Pharmacy Consult service  Assessment/Plan:  The patient was reviewed  Renal function is stable and no signs or symptoms of nephrotoxicity and/or infusion reactions were documented in the chart  Based on todays assessment, continue current vancomycin (day # 2) dosing of 1500mg q24h, with a plan for trough to be drawn at 1730 on 9/6/21 unless clinical status changes beforehand  We will continue to follow the patients culture results and clinical progress daily      Violet Fraga, Pharmacist

## 2021-09-04 NOTE — PROGRESS NOTES
[address]  Progress Note David Segundo 1950, 73 y o  male MRN: [id number]  Unit/Bed#: -Trav Encounter: [id number]  Primary Care Provider: No primary care provider on file  Date and time admitted to hospital: 9/3/2021  5:14 PM    * Sepsis [facility])  Assessment & Plan  Patient reporting development of cough, weakness, chills, dyspnea, decreased appetite, nausea without vomiting, and fever of 101F at home today  No recent sick contacts, patient is fully vaccinated against COVID  · Sepsis criteria; Fever 101F measured at home, resp 24cpm in ED; chills, rigors, and SOB at home plus CBC left shift suggestive of possible infectious lung process superimposed on volume overload  · CXR BL lower lobe infiltrates   · 96% O2 1L NC, /49; monitor  · Lactic acid 2 6  · Troponin WNL  · Infectious workup and abx ordered in ED  · Procalcitonin ordered   · Continue cefepime and vancomycin  · Given suggestive acute CHF exacerbation, stable BP, with lactic acid less than 4; holding IV fluids in setting of acute CHF  · Monitor BP and consider IVF infusion if needed for hypotension      9/4/21: patient examined this morning  He is still requiring 2-3L NC when he ambulates, at rest he seems to be ok on room air  He has been afebrile over the past 24 hours  He states he is very short of breath when ambulating    -noted 2/2 positive blood cultures for gram positive cocci in pairs and chains  -echo pending  -may need ID consult depending on the speciation of this organsim  Acute CHF (congestive heart failure) (HCC)  Assessment & Plan  Wt Readings from Last 3 Encounters:   09/04/21 103 kg (227 lb 1 2 oz)       · CXR BL lower lobe infiltrates and BNP 3286 suggestive of volume overload  · Echo ordered   · 40mg IV Lasix ordered  · I/O's / daily weights  · Reevaluate need for further diuretics     9/4/21:  Patient with crackles at lung bases  Will continue IV Lasix 40 mg b i d   And assess response   -echo pending  -repeat cxr pa and lateral pending      Acute kidney injury superimposed on chronic kidney disease [facility])  Assessment & Plan  Lab Results   Component Value Date    EGFR 26 09/04/2021    EGFR 25 09/03/2021    CREATININE 2 46 (H) 09/04/2021    CREATININE 2 49 (H) 09/03/2021     · Creatinine on admission 2 49; baseline around 2 10  · Likely in setting of CHF volume overload vs prerenal SEPSIS  · Giving 40mg IV lasix; Monitor BP, AM BMP ordered, evaluate in AM for need of further diuretic vs IVF administration      9/4/21: kidney function is stable, likely component of cardiorenal, will continue to follow  Hypertension  Assessment & Plan  · Latest /49  · Continue home HTN medications  · Holding IVF in setting of acute CHF; consider administration if hypotensive and lungs are clear  · Monitor BP in setting of SEPSIS      Type 2 diabetes mellitus ([facility] [address] )  Assessment & Plan  No results found for: HGBA1C    Recent Labs     09/03/21  2203 09/04/21  0705 09/04/21  1051 09/04/21  1624   POCGLU 199* 244* 336* 369*     (P) 287  · Blood glucose 142 on admission  · A1c 7 5 on 8/21  · Patient not prescribed outpatient diabetic medications  · Mealtime SSI ordered  · Monitor blood glucose   · Hypoglycemia protocol  · Outpatient PCP follow up      9/4/21: reviewed home meds with family, patient is on Lantus 25 units b i d   And he is also on mealtime insulin 25-30 units depending on his blood sugar    -continue Lantus 25 b i d   Mealtime on 25 units  -sliding scale    Anemia  Assessment & Plan  · HGB 9 8; type and screen obtained in ED  · No signs of active bleeding; likely related to CKD  · Continue to monitor for s/s of active bleeding  · Transfuse if necessary   · AM CBC ordered     9/4/21: hgb relatively stable at 9 3, patient did receive fluids as well  VTE Pharmacologic Prophylaxis: VTE Score: 6 High Risk (Score >/= 5) - Pharmacological DVT Prophylaxis Ordered: apixaban (Eliquis)  Sequential Compression Devices Ordered  Patient Centered Rounds: I performed bedside rounds with nursing staff today  Discussions with Specialists or Other Care Team Provider: n/a    Education and Discussions with Family / Patient: Updated  (wife) at bedside  Time Spent for Care: 20 minutes  More than 50% of total time spent on counseling and coordination of care as described above  Current Length of Stay: 0 day(s)  Current Patient Status: Inpatient   Certification Statement: The patient will continue to require additional inpatient hospital stay due to iv abx and iv diuresis  Discharge Plan: Anticipate discharge in 48-72 hrs to discharge location to be determined pending rehab evaluations  Code Status: Level 1 - Full Code    Subjective:   Patient denies any complaints apart from shortness of breath when ambulating to the bathroom today  He is alert and oriented x3  Objective:     Vitals:   Temp (24hrs), Av 8 °F (37 1 °C), Min:97 4 °F (36 3 °C), Max:100 °F (37 8 °C)    Temp:  [97 4 °F (36 3 °C)-100 °F (37 8 °C)] 97 4 °F (36 3 °C)  HR:  [69-85] 70  Resp:  [14-25] 18  BP: (102-127)/(46-59) 119/58  SpO2:  [86 %-100 %] 86 %  Body mass index is 37 79 kg/m²  Input and Output Summary (last 24 hours): Intake/Output Summary (Last 24 hours) at 2021 1704  Last data filed at 2021 1556  Gross per 24 hour   Intake 720 ml   Output 1950 ml   Net -1230 ml       Physical Exam:   Physical Exam  Vitals and nursing note reviewed  Constitutional:       General: He is not in acute distress  Appearance: Normal appearance  He is not ill-appearing  HENT:      Head: Normocephalic and atraumatic  Eyes:      General: No scleral icterus  Cardiovascular:      Rate and Rhythm: Normal rate and regular rhythm  Heart sounds: Murmur heard  No friction rub  No gallop  Pulmonary:      Effort: No respiratory distress  Breath sounds: No stridor  Rales present   No wheezing or rhonchi  Chest:      Chest wall: No tenderness  Abdominal:      General: Abdomen is flat  There is no distension  Palpations: Abdomen is soft  Tenderness: There is no abdominal tenderness  There is no guarding  Musculoskeletal:         General: No swelling, tenderness, deformity or signs of injury  Right lower leg: Edema present  Left lower leg: Edema present  Comments: Moderate bilateral edema   Skin:     Coloration: Skin is not jaundiced or pale  Findings: No bruising or erythema  Neurological:      Mental Status: He is alert and oriented to person, place, and time            Additional Data:     Labs:  Results from last 7 days   Lab Units 09/04/21  0537 09/03/21  1741   WBC Thousand/uL 10 15 9 61   HEMOGLOBIN g/dL 9 3* 9 8*   HEMATOCRIT % 28 3* 30 4*   PLATELETS Thousands/uL 160 182   BANDS PCT % 2  --    NEUTROS PCT %  --  87*   LYMPHS PCT %  --  3*   LYMPHO PCT % 3*  --    MONOS PCT %  --  9   MONO PCT % 5  --    EOS PCT % 0 0     Results from last 7 days   Lab Units 09/04/21  0537 09/03/21  1741   SODIUM mmol/L 133* 136   POTASSIUM mmol/L 3 7 3 1*   CHLORIDE mmol/L 97* 97*   CO2 mmol/L 30 31   BUN mg/dL 44* 45*   CREATININE mg/dL 2 46* 2 49*   ANION GAP mmol/L 6 8   CALCIUM mg/dL 7 9* 8 4   ALBUMIN g/dL  --  2 6*   TOTAL BILIRUBIN mg/dL  --  0 51   ALK PHOS U/L  --  84   ALT U/L  --  36   AST U/L  --  34   GLUCOSE RANDOM mg/dL 228* 142*     Results from last 7 days   Lab Units 09/03/21  1741   INR  1 75*     Results from last 7 days   Lab Units 09/04/21  1624 09/04/21  1051 09/04/21  0705 09/03/21  2203   POC GLUCOSE mg/dl 369* 336* 244* 199*         Results from last 7 days   Lab Units 09/03/21  2337 09/03/21  2015 09/03/21  1741   LACTIC ACID mmol/L  --  1 4 2 6*   PROCALCITONIN ng/ml 2 25*  --   --        Lines/Drains:  Invasive Devices     Peripheral Intravenous Line            Peripheral IV 09/03/21 Left Antecubital 1 day                      Imaging: Reviewed radiology reports from this admission including: chest xray    Recent Cultures (last 7 days):   Results from last 7 days   Lab Units 09/03/21  1741   GRAM STAIN RESULT  Gram positive cocci in pairs and chains*  Gram positive cocci in pairs and chains*       Last 24 Hours Medication List:   Current Facility-Administered Medications   Medication Dose Route Frequency Provider Last Rate    acetaminophen  650 mg Oral Q6H PRN Kamilla Gonzalez PA-C      amLODIPine  5 mg Oral Daily [place], [place]      apixaban  5 mg Oral BID [place], [place]      cefepime  1,000 mg Intravenous Q12H Kamilla Gonzalez PA-C 1,000 mg (09/04/21 0556)    furosemide  40 mg Intravenous BID (diuretic) Vladimir Burns DO      insulin glargine  25 Units Subcutaneous HS Vladimir Burns DO      insulin lispro  1-6 Units Subcutaneous TID AC [place]iner [place]      insulin lispro  1-6 Units Subcutaneous HS [facility]      insulin lispro  20 Units Subcutaneous TID With Meals Vladimir Burns DO      metoprolol tartrate  12 5 mg Oral Q12H [address] Vladimir Katy, DO      ondansetron  4 mg Intravenous Q6H PRN [facility]      PARoxetine  20 mg Oral Daily [place], [place]      senna-docusate sodium  1 tablet Oral HS Vladimir Burns DO      vancomycin  20 mg/kg (Adjusted) Intravenous Q24H Kamilla Gonzalez PA-C          Today, Patient Was Seen By: Radhika Otoole DO    **Please Note: This note may have been constructed using a voice recognition system  **

## 2021-09-04 NOTE — PLAN OF CARE
Problem: Potential for Falls  Goal: Patient will remain free of falls  Description: INTERVENTIONS:  - Educate patient/family on patient safety including physical limitations  - Instruct patient to call for assistance with activity   - Consult OT/PT to assist with strengthening/mobility   - Keep Call bell within reach  - Keep bed low and locked with side rails adjusted as appropriate  - Keep care items and personal belongings within reach  - Initiate and maintain comfort rounds  - Make Fall Risk Sign visible to staff  - Offer Toileting every 2 Hours, in advance of need  - Initiate/Maintain bed alarm  - Obtain necessary fall risk management equipment  - Apply yellow socks and bracelet for high fall risk patients  - Consider moving patient to room near nurses station  Outcome: Progressing     Problem: MOBILITY - ADULT  Goal: Maintain or return to baseline ADL function  Description: INTERVENTIONS:  -  Assess patient's ability to carry out ADLs; assess patient's baseline for ADL function and identify physical deficits which impact ability to perform ADLs (bathing, care of mouth/teeth, toileting, grooming, dressing, etc )  - Assess/evaluate cause of self-care deficits   - Assess range of motion  - Assess patient's mobility; develop plan if impaired  - Assess patient's need for assistive devices and provide as appropriate  - Encourage maximum independence but intervene and supervise when necessary  - Involve family in performance of ADLs  - Assess for home care needs following discharge   - Consider OT consult to assist with ADL evaluation and planning for discharge  - Provide patient education as appropriate  Outcome: Progressing  Goal: Maintains/Returns to pre admission functional level  Description: INTERVENTIONS:  - Perform BMAT or MOVE assessment daily    - Set and communicate daily mobility goal to care team and patient/family/caregiver     - Collaborate with rehabilitation services on mobility goals if consulted  - Perform Range of Motion 3 times a day  - Reposition patient every 2 hours    - Dangle patient 3 times a day  - Stand patient 3 times a day  - Ambulate patient 3 times a day  - Out of bed to chair 3 times a day   - Out of bed for meals 3 times a day  - Out of bed for toileting  - Record patient progress and toleration of activity level   Outcome: Progressing     Problem: Prexisting or High Potential for Compromised Skin Integrity  Goal: Skin integrity is maintained or improved  Description: INTERVENTIONS:  - Identify patients at risk for skin breakdown  - Assess and monitor skin integrity  - Assess and monitor nutrition and hydration status  - Monitor labs   - Assess for incontinence   - Turn and reposition patient  - Assist with mobility/ambulation  - Relieve pressure over bony prominences  - Avoid friction and shearing  - Provide appropriate hygiene as needed including keeping skin clean and dry  - Evaluate need for skin moisturizer/barrier cream  - Collaborate with interdisciplinary team   - Patient/family teaching  - Consider wound care consult   Outcome: Progressing

## 2021-09-04 NOTE — ASSESSMENT & PLAN NOTE
Wt Readings from Last 3 Encounters:   09/03/21 103 kg (227 lb 4 7 oz)       · CXR BL lower lobe infiltrates and BNP 3286 suggestive of volume overload  · Echo ordered   · 40mg IV Lasix ordered  · I/O's / daily weights  · Reevaluate need for further diuretics

## 2021-09-04 NOTE — ASSESSMENT & PLAN NOTE
· Latest /49  · Continue home HTN medications  · Holding IVF in setting of acute CHF; consider administration if hypotensive and lungs are clear  · Monitor BP in setting of SEPSIS

## 2021-09-05 ENCOUNTER — APPOINTMENT (INPATIENT)
Dept: NON INVASIVE DIAGNOSTICS | Facility: HOSPITAL | Age: 71
DRG: 871 | End: 2021-09-05
Payer: MEDICARE

## 2021-09-05 LAB
ANION GAP SERPL CALCULATED.3IONS-SCNC: 7 MMOL/L (ref 4–13)
BASOPHILS # BLD AUTO: 0.08 THOUSANDS/ΜL (ref 0–0.1)
BASOPHILS NFR BLD AUTO: 1 % (ref 0–1)
BUN SERPL-MCNC: 48 MG/DL (ref 5–25)
CALCIUM SERPL-MCNC: 7.8 MG/DL (ref 8.3–10.1)
CHLORIDE SERPL-SCNC: 96 MMOL/L (ref 100–108)
CO2 SERPL-SCNC: 30 MMOL/L (ref 21–32)
CREAT SERPL-MCNC: 2.21 MG/DL (ref 0.6–1.3)
EOSINOPHIL # BLD AUTO: 0.26 THOUSAND/ΜL (ref 0–0.61)
EOSINOPHIL NFR BLD AUTO: 4 % (ref 0–6)
ERYTHROCYTE [DISTWIDTH] IN BLOOD BY AUTOMATED COUNT: 13.6 % (ref 11.6–15.1)
GFR SERPL CREATININE-BSD FRML MDRD: 29 ML/MIN/1.73SQ M
GLUCOSE SERPL-MCNC: 212 MG/DL (ref 65–140)
GLUCOSE SERPL-MCNC: 241 MG/DL (ref 65–140)
GLUCOSE SERPL-MCNC: 277 MG/DL (ref 65–140)
GLUCOSE SERPL-MCNC: 319 MG/DL (ref 65–140)
GLUCOSE SERPL-MCNC: 343 MG/DL (ref 65–140)
HCT VFR BLD AUTO: 28.4 % (ref 36.5–49.3)
HGB BLD-MCNC: 9.2 G/DL (ref 12–17)
IMM GRANULOCYTES # BLD AUTO: 0.06 THOUSAND/UL (ref 0–0.2)
IMM GRANULOCYTES NFR BLD AUTO: 1 % (ref 0–2)
LYMPHOCYTES # BLD AUTO: 0.62 THOUSANDS/ΜL (ref 0.6–4.47)
LYMPHOCYTES NFR BLD AUTO: 9 % (ref 14–44)
MCH RBC QN AUTO: 28.7 PG (ref 26.8–34.3)
MCHC RBC AUTO-ENTMCNC: 32.4 G/DL (ref 31.4–37.4)
MCV RBC AUTO: 89 FL (ref 82–98)
MONOCYTES # BLD AUTO: 0.98 THOUSAND/ΜL (ref 0.17–1.22)
MONOCYTES NFR BLD AUTO: 15 % (ref 4–12)
MRSA NOSE QL CULT: NORMAL
NEUTROPHILS # BLD AUTO: 4.73 THOUSANDS/ΜL (ref 1.85–7.62)
NEUTS SEG NFR BLD AUTO: 70 % (ref 43–75)
NRBC BLD AUTO-RTO: 0 /100 WBCS
PLATELET # BLD AUTO: 179 THOUSANDS/UL (ref 149–390)
PMV BLD AUTO: 9.2 FL (ref 8.9–12.7)
POTASSIUM SERPL-SCNC: 3.2 MMOL/L (ref 3.5–5.3)
RBC # BLD AUTO: 3.21 MILLION/UL (ref 3.88–5.62)
SODIUM SERPL-SCNC: 133 MMOL/L (ref 136–145)
WBC # BLD AUTO: 6.73 THOUSAND/UL (ref 4.31–10.16)

## 2021-09-05 PROCEDURE — 82948 REAGENT STRIP/BLOOD GLUCOSE: CPT

## 2021-09-05 PROCEDURE — 80048 BASIC METABOLIC PNL TOTAL CA: CPT | Performed by: INTERNAL MEDICINE

## 2021-09-05 PROCEDURE — 84145 PROCALCITONIN (PCT): CPT

## 2021-09-05 PROCEDURE — 93306 TTE W/DOPPLER COMPLETE: CPT

## 2021-09-05 PROCEDURE — 85025 COMPLETE CBC W/AUTO DIFF WBC: CPT | Performed by: INTERNAL MEDICINE

## 2021-09-05 PROCEDURE — 99232 SBSQ HOSP IP/OBS MODERATE 35: CPT | Performed by: INTERNAL MEDICINE

## 2021-09-05 PROCEDURE — 93306 TTE W/DOPPLER COMPLETE: CPT | Performed by: INTERNAL MEDICINE

## 2021-09-05 RX ORDER — POTASSIUM CHLORIDE 20 MEQ/1
40 TABLET, EXTENDED RELEASE ORAL 2 TIMES DAILY
Status: COMPLETED | OUTPATIENT
Start: 2021-09-05 | End: 2021-09-05

## 2021-09-05 RX ORDER — INSULIN GLARGINE 100 [IU]/ML
40 INJECTION, SOLUTION SUBCUTANEOUS
Status: DISCONTINUED | OUTPATIENT
Start: 2021-09-05 | End: 2021-09-06

## 2021-09-05 RX ADMIN — POTASSIUM CHLORIDE 40 MEQ: 1500 TABLET, EXTENDED RELEASE ORAL at 18:37

## 2021-09-05 RX ADMIN — CEFEPIME HYDROCHLORIDE 1000 MG: 2 INJECTION, POWDER, FOR SOLUTION INTRAVENOUS at 18:37

## 2021-09-05 RX ADMIN — APIXABAN 5 MG: 5 TABLET, FILM COATED ORAL at 18:37

## 2021-09-05 RX ADMIN — VANCOMYCIN HYDROCHLORIDE 1500 MG: 1 INJECTION, POWDER, LYOPHILIZED, FOR SOLUTION INTRAVENOUS at 19:09

## 2021-09-05 RX ADMIN — Medication 12.5 MG: at 21:58

## 2021-09-05 RX ADMIN — APIXABAN 5 MG: 5 TABLET, FILM COATED ORAL at 08:43

## 2021-09-05 RX ADMIN — INSULIN LISPRO 5 UNITS: 100 INJECTION, SOLUTION INTRAVENOUS; SUBCUTANEOUS at 16:57

## 2021-09-05 RX ADMIN — ACETAMINOPHEN 650 MG: 325 TABLET, FILM COATED ORAL at 22:03

## 2021-09-05 RX ADMIN — INSULIN LISPRO 5 UNITS: 100 INJECTION, SOLUTION INTRAVENOUS; SUBCUTANEOUS at 11:13

## 2021-09-05 RX ADMIN — FUROSEMIDE 40 MG: 10 INJECTION, SOLUTION INTRAMUSCULAR; INTRAVENOUS at 16:58

## 2021-09-05 RX ADMIN — INSULIN LISPRO 20 UNITS: 100 INJECTION, SOLUTION INTRAVENOUS; SUBCUTANEOUS at 07:50

## 2021-09-05 RX ADMIN — ACETAMINOPHEN 650 MG: 325 TABLET, FILM COATED ORAL at 00:37

## 2021-09-05 RX ADMIN — INSULIN GLARGINE 40 UNITS: 100 INJECTION, SOLUTION SUBCUTANEOUS at 21:58

## 2021-09-05 RX ADMIN — CEFEPIME HYDROCHLORIDE 1000 MG: 2 INJECTION, POWDER, FOR SOLUTION INTRAVENOUS at 05:19

## 2021-09-05 RX ADMIN — DOCUSATE SODIUM AND SENNOSIDES 1 TABLET: 8.6; 5 TABLET, FILM COATED ORAL at 21:58

## 2021-09-05 RX ADMIN — AMLODIPINE BESYLATE 5 MG: 5 TABLET ORAL at 09:32

## 2021-09-05 RX ADMIN — INSULIN LISPRO 4 UNITS: 100 INJECTION, SOLUTION INTRAVENOUS; SUBCUTANEOUS at 21:58

## 2021-09-05 RX ADMIN — POTASSIUM CHLORIDE 40 MEQ: 1500 TABLET, EXTENDED RELEASE ORAL at 10:30

## 2021-09-05 RX ADMIN — INSULIN LISPRO 3 UNITS: 100 INJECTION, SOLUTION INTRAVENOUS; SUBCUTANEOUS at 07:50

## 2021-09-05 RX ADMIN — Medication 12.5 MG: at 09:32

## 2021-09-05 RX ADMIN — FUROSEMIDE 40 MG: 10 INJECTION, SOLUTION INTRAMUSCULAR; INTRAVENOUS at 08:43

## 2021-09-05 RX ADMIN — INSULIN HUMAN 10 UNITS: 100 INJECTION, SOLUTION PARENTERAL at 16:58

## 2021-09-05 RX ADMIN — PAROXETINE 20 MG: 20 TABLET, FILM COATED ORAL at 08:43

## 2021-09-05 NOTE — ASSESSMENT & PLAN NOTE
Patient reporting development of cough, weakness, chills, dyspnea, decreased appetite, nausea without vomiting, and fever of 101F at home today  No recent sick contacts, patient is fully vaccinated against COVID  · Sepsis criteria; Fever 101F measured at home, resp 24cpm in ED; chills, rigors, and SOB at home plus CBC left shift suggestive of possible infectious lung process superimposed on volume overload  · CXR BL lower lobe infiltrates   · 96% O2 1L NC, /49; monitor  · Lactic acid 2 6  · Troponin WNL  · Infectious workup and abx ordered in ED  · Procalcitonin ordered   · Continue cefepime and vancomycin  · Given suggestive acute CHF exacerbation, stable BP, with lactic acid less than 4; holding IV fluids in setting of acute CHF  · Monitor BP and consider IVF infusion if needed for hypotension      9/5/21: patient examined this morning  He is still requiring 2-3L NC when he ambulates, at rest he seems to be ok on room air  He has been afebrile over the past 48 hours     -noted 2/2 positive blood cultures for gram positive cocci in pairs and chains, now enterococcus faecalis pending speciation, can likely change to ampicillin once speciated  -echo pending

## 2021-09-05 NOTE — ASSESSMENT & PLAN NOTE
· HGB 9 8; type and screen obtained in ED  · No signs of active bleeding; likely related to CKD  · Continue to monitor for s/s of active bleeding  · Transfuse if necessary   · AM CBC ordered     9/4/21: hgb relatively stable at 9 3, patient did receive fluids as well    9/5/21: hgb stable at 9 2, no signs of bleeding

## 2021-09-05 NOTE — ASSESSMENT & PLAN NOTE
No results found for: HGBA1C    Recent Labs     09/04/21  2229 09/05/21  0625 09/05/21  1037 09/05/21  1552   POCGLU 261* 241* 343* 319*     (P) 289  · Blood glucose 142 on admission  · A1c 7 5 on 8/21  · Patient not prescribed outpatient diabetic medications  · Mealtime SSI ordered  · Monitor blood glucose   · Hypoglycemia protocol  · Outpatient PCP follow up      9/4/21: reviewed home meds with family, patient is on Lantus 25 units b i d   And he is also on mealtime insulin 25-30 units depending on his blood sugar    -continue Lantus 25 b i d   Mealtime on 25 units  -sliding scale    9/5/21: hyperglycemia secondary to infection  -increase lantus to 40 units nightime  -mealtime to 30 units  -continue ssi

## 2021-09-05 NOTE — ASSESSMENT & PLAN NOTE
Lab Results   Component Value Date    EGFR 29 09/05/2021    EGFR 26 09/04/2021    EGFR 25 09/03/2021    CREATININE 2 21 (H) 09/05/2021    CREATININE 2 46 (H) 09/04/2021    CREATININE 2 49 (H) 09/03/2021     · Creatinine on admission 2 49; baseline around 2 10  · Likely in setting of CHF volume overload vs prerenal SEPSIS  · Giving 40mg IV lasix; Monitor BP, AM BMP ordered, evaluate in AM for need of further diuretic vs IVF administration      9/4/21: kidney function is stable, likely component of cardiorenal, will continue to follow    9/5/21: kidney function improving with diuresis

## 2021-09-05 NOTE — ASSESSMENT & PLAN NOTE
Wt Readings from Last 3 Encounters:   09/05/21 103 kg (227 lb 1 2 oz)       · CXR BL lower lobe infiltrates and BNP 3286 suggestive of volume overload  · Echo ordered   · 40mg IV Lasix ordered  · I/O's / daily weights  · Reevaluate need for further diuretics     9/4/21:  Patient with crackles at lung bases  Will continue IV Lasix 40 mg b i d  And assess response   -echo pending  -repeat cxr pa and lateral pending    9/5/21: patient reports improvement in breathing  -good urine output over the past 24 hours, will continue current regiment and likely transition to po in next 1-2 days    -fluid restriction and sodium restricted diet

## 2021-09-05 NOTE — PROGRESS NOTES
[facility]  Progress Note Octavia Reasoner 1950, 73 y o  male MRN: [id number]  Unit/Bed#: -Trav Encounter: [id number]  Primary Care Provider: No primary care provider on file  Date and time admitted to hospital: 9/3/2021  5:14 PM    * Sepsis [facility])  Assessment & Plan  Patient reporting development of cough, weakness, chills, dyspnea, decreased appetite, nausea without vomiting, and fever of 101F at home today  No recent sick contacts, patient is fully vaccinated against COVID  · Sepsis criteria; Fever 101F measured at home, resp 24cpm in ED; chills, rigors, and SOB at home plus CBC left shift suggestive of possible infectious lung process superimposed on volume overload  · CXR BL lower lobe infiltrates   · 96% O2 1L NC, /49; monitor  · Lactic acid 2 6  · Troponin WNL  · Infectious workup and abx ordered in ED  · Procalcitonin ordered   · Continue cefepime and vancomycin  · Given suggestive acute CHF exacerbation, stable BP, with lactic acid less than 4; holding IV fluids in setting of acute CHF  · Monitor BP and consider IVF infusion if needed for hypotension      9/5/21: patient examined this morning  He is still requiring 2-3L NC when he ambulates, at rest he seems to be ok on room air  He has been afebrile over the past 48 hours  -noted 2/2 positive blood cultures for gram positive cocci in pairs and chains, now enterococcus faecalis pending speciation, can likely change to ampicillin once speciated  -echo pending      Acute CHF (congestive heart failure) (HCC)  Assessment & Plan  Wt Readings from Last 3 Encounters:   09/05/21 103 kg (227 lb 1 2 oz)       · CXR BL lower lobe infiltrates and BNP 3286 suggestive of volume overload  · Echo ordered   · 40mg IV Lasix ordered  · I/O's / daily weights  · Reevaluate need for further diuretics     9/4/21:  Patient with crackles at lung bases  Will continue IV Lasix 40 mg b i d   And assess response   -echo pending  -repeat cxr pa and lateral pending    9/5/21: patient reports improvement in breathing  -good urine output over the past 24 hours, will continue current regiment and likely transition to po in next 1-2 days    -fluid restriction and sodium restricted diet  Acute kidney injury superimposed on chronic kidney disease [facility])  Assessment & Plan  Lab Results   Component Value Date    EGFR 29 09/05/2021    EGFR 26 09/04/2021    EGFR 25 09/03/2021    CREATININE 2 21 (H) 09/05/2021    CREATININE 2 46 (H) 09/04/2021    CREATININE 2 49 (H) 09/03/2021     · Creatinine on admission 2 49; baseline around 2 10  · Likely in setting of CHF volume overload vs prerenal SEPSIS  · Giving 40mg IV lasix; Monitor BP, AM BMP ordered, evaluate in AM for need of further diuretic vs IVF administration      9/4/21: kidney function is stable, likely component of cardiorenal, will continue to follow    9/5/21: kidney function improving with diuresis    Hypertension  Assessment & Plan  · Latest /49  · Continue home HTN medications  · Holding IVF in setting of acute CHF; consider administration if hypotensive and lungs are clear  · Monitor BP in setting of SEPSIS      Type 2 diabetes mellitus ([facility] [address] )  Assessment & Plan  No results found for: HGBA1C    Recent Labs     09/04/21  2229 09/05/21  0625 09/05/21  1037 09/05/21  1552   POCGLU 261* 241* 343* 319*     (P) 289  · Blood glucose 142 on admission  · A1c 7 5 on 8/21  · Patient not prescribed outpatient diabetic medications  · Mealtime SSI ordered  · Monitor blood glucose   · Hypoglycemia protocol  · Outpatient PCP follow up      9/4/21: reviewed home meds with family, patient is on Lantus 25 units b i d   And he is also on mealtime insulin 25-30 units depending on his blood sugar    -continue Lantus 25 b i d   Mealtime on 25 units  -sliding scale    9/5/21: hyperglycemia secondary to infection  -increase lantus to 40 units nightime  -mealtime to 30 units  -continue ssi    Anemia  Assessment & Plan  · HGB 9 8; type and screen obtained in ED  · No signs of active bleeding; likely related to CKD  · Continue to monitor for s/s of active bleeding  · Transfuse if necessary   · AM CBC ordered     21: hgb relatively stable at 9 3, patient did receive fluids as well  21: hgb stable at 9 2, no signs of bleeding          VTE Pharmacologic Prophylaxis: VTE Score: 6 High Risk (Score >/= 5) - Pharmacological DVT Prophylaxis Ordered: apixaban (Eliquis)  Sequential Compression Devices Ordered  Patient Centered Rounds: I performed bedside rounds with nursing staff today  Discussions with Specialists or Other Care Team Provider: n/a    Education and Discussions with Family / Patient: Updated  (mother) at bedside  Time Spent for Care: 20 minutes  More than 50% of total time spent on counseling and coordination of care as described above  Current Length of Stay: 1 day(s)  Current Patient Status: Inpatient   Certification Statement: The patient will continue to require additional inpatient hospital stay due to iv abx and iv diuresis  Discharge Plan: Anticipate discharge in 48 hrs to home  Code Status: Level 1 - Full Code    Subjective:   Patient denies any complaints this morning  Reports breathing is improved  Denies fever or chills, nausea or vomiting  Some shortness of breath when ambulating to the bathroom, still requiring oxygen  Objective:     Vitals:   Temp (24hrs), Av 6 °F (37 °C), Min:98 1 °F (36 7 °C), Max:99 5 °F (37 5 °C)    Temp:  [98 1 °F (36 7 °C)-99 5 °F (37 5 °C)] 98 2 °F (36 8 °C)  HR:  [63-75] 63  Resp:  [16-20] 17  BP: ()/(43-61) 111/61  SpO2:  [87 %-94 %] 92 %  Body mass index is 37 79 kg/m²  Input and Output Summary (last 24 hours):      Intake/Output Summary (Last 24 hours) at 2021 1650  Last data filed at 2021 1332  Gross per 24 hour   Intake 1320 ml   Output 2175 ml   Net -855 ml       Physical Exam:   Physical Exam  Cardiovascular: Rate and Rhythm: Normal rate and regular rhythm  Heart sounds: Murmur heard  Pulmonary:      Effort: No respiratory distress  Breath sounds: No stridor  Rales present  No wheezing or rhonchi  Abdominal:      General: Abdomen is flat  There is no distension  Palpations: Abdomen is soft  There is no mass  Tenderness: There is no abdominal tenderness  There is no right CVA tenderness, left CVA tenderness, guarding or rebound  Hernia: No hernia is present  Musculoskeletal:         General: No swelling, tenderness, deformity or signs of injury  Skin:     Coloration: Skin is not jaundiced or pale  Findings: No bruising, erythema, lesion or rash  Neurological:      Mental Status: He is oriented to person, place, and time            Additional Data:     Labs:  Results from last 7 days   Lab Units 09/05/21  0509 09/04/21  0537   WBC Thousand/uL 6 73 10 15   HEMOGLOBIN g/dL 9 2* 9 3*   HEMATOCRIT % 28 4* 28 3*   PLATELETS Thousands/uL 179 160   BANDS PCT %  --  2   NEUTROS PCT % 70  --    LYMPHS PCT % 9*  --    LYMPHO PCT %  --  3*   MONOS PCT % 15*  --    MONO PCT %  --  5   EOS PCT % 4 0     Results from last 7 days   Lab Units 09/05/21  0509 09/03/21  1741   SODIUM mmol/L 133* 136   POTASSIUM mmol/L 3 2* 3 1*   CHLORIDE mmol/L 96* 97*   CO2 mmol/L 30 31   BUN mg/dL 48* 45*   CREATININE mg/dL 2 21* 2 49*   ANION GAP mmol/L 7 8   CALCIUM mg/dL 7 8* 8 4   ALBUMIN g/dL  --  2 6*   TOTAL BILIRUBIN mg/dL  --  0 51   ALK PHOS U/L  --  84   ALT U/L  --  36   AST U/L  --  34   GLUCOSE RANDOM mg/dL 212* 142*     Results from last 7 days   Lab Units 09/03/21  1741   INR  1 75*     Results from last 7 days   Lab Units 09/05/21  1552 09/05/21  1037 09/05/21  0625 09/04/21  2229 09/04/21  1624 09/04/21  1051 09/04/21  0705 09/03/21  2203   POC GLUCOSE mg/dl 319* 343* 241* 261* 369* 336* 244* 199*         Results from last 7 days   Lab Units 09/03/21  2337 09/03/21 2015 09/03/21  1741   LACTIC ACID mmol/L  --  1 4 2 6*   PROCALCITONIN ng/ml 2 25*  --   --        Lines/Drains:  Invasive Devices     Peripheral Intravenous Line            Peripheral IV 09/05/21 Right Wrist <1 day                      Imaging: Reviewed radiology reports from this admission including: chest xray    Recent Cultures (last 7 days):   Results from last 7 days   Lab Units 09/03/21  1741   BLOOD CULTURE  Enterococcus faecalis*  Enterococcus faecalis*   GRAM STAIN RESULT  Gram positive cocci in pairs and chains*  Gram positive cocci in pairs and chains*       Last 24 Hours Medication List:   Current Facility-Administered Medications   Medication Dose Route Frequency Provider Last Rate    acetaminophen  650 mg Oral Q6H PRN Yvette Baltazar PA-C      amLODIPine  5 mg Oral Daily [place], [place]      apixaban  5 mg Oral BID [facility]      cefepime  1,000 mg Intravenous Q12H [facility] SUDHAKAR piedra 1,000 mg (09/05/21 0519)    furosemide  40 mg Intravenous BID (diuretic) Vladimir Burns DO      insulin glargine  40 Units Subcutaneous HS Vladimir Burns DO      insulin lispro  1-6 Units Subcutaneous TID AC Yuri piedra PA-C      insulin lispro  1-6 Units Subcutaneous HS [facility] SUDHAKAR piedra      [START ON 9/6/2021] insulin lispro  30 Units Subcutaneous TID With Meals Vladimir Burns, DO      insulin regular  10 Units Subcutaneous Once Vladimir Burns, DO      metoprolol tartrate  12 5 mg Oral Q12H [address] Vladimir Burns, DO      ondansetron  4 mg Intravenous Q6H PRN Woody Richards PA-C      PARoxetine  20 mg Oral Daily [place], [place]      potassium chloride  40 mEq Oral BID Vladimir Burns DO      senna-docusate sodium  1 tablet Oral HS Vladimir Burns DO      vancomycin  20 mg/kg (Adjusted) Intravenous Q24H Woody piedra PA-C 1,500 mg (09/04/21 1911)        Today, Patient Was Seen By: Alan Emmanuel DO    **Please Note: This note may have been constructed using a voice recognition system  **

## 2021-09-05 NOTE — PROGRESS NOTES
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

## 2021-09-06 ENCOUNTER — APPOINTMENT (INPATIENT)
Dept: CT IMAGING | Facility: HOSPITAL | Age: 71
DRG: 871 | End: 2021-09-06
Payer: MEDICARE

## 2021-09-06 PROBLEM — J96.00 ACUTE RESPIRATORY FAILURE (HCC): Status: ACTIVE | Noted: 2021-09-06

## 2021-09-06 LAB
ATRIAL RATE: 74 BPM
BACTERIA BLD CULT: ABNORMAL
BACTERIA BLD CULT: ABNORMAL
GLUCOSE SERPL-MCNC: 181 MG/DL (ref 65–140)
GLUCOSE SERPL-MCNC: 268 MG/DL (ref 65–140)
GLUCOSE SERPL-MCNC: 289 MG/DL (ref 65–140)
GRAM STN SPEC: ABNORMAL
GRAM STN SPEC: ABNORMAL
P AXIS: 22 DEGREES
PR INTERVAL: 206 MS
PROCALCITONIN SERPL-MCNC: 3.63 NG/ML
QRS AXIS: 45 DEGREES
QRSD INTERVAL: 100 MS
QT INTERVAL: 416 MS
QTC INTERVAL: 461 MS
T WAVE AXIS: 6 DEGREES
VENTRICULAR RATE: 74 BPM

## 2021-09-06 PROCEDURE — 99233 SBSQ HOSP IP/OBS HIGH 50: CPT | Performed by: FAMILY MEDICINE

## 2021-09-06 PROCEDURE — 99223 1ST HOSP IP/OBS HIGH 75: CPT | Performed by: INTERNAL MEDICINE

## 2021-09-06 PROCEDURE — 82948 REAGENT STRIP/BLOOD GLUCOSE: CPT

## 2021-09-06 PROCEDURE — 93010 ELECTROCARDIOGRAM REPORT: CPT | Performed by: INTERNAL MEDICINE

## 2021-09-06 RX ORDER — INSULIN GLARGINE 100 [IU]/ML
45 INJECTION, SOLUTION SUBCUTANEOUS
Status: DISCONTINUED | OUTPATIENT
Start: 2021-09-06 | End: 2021-09-09 | Stop reason: HOSPADM

## 2021-09-06 RX ORDER — INSULIN GLARGINE 100 [IU]/ML
50 INJECTION, SOLUTION SUBCUTANEOUS
Status: DISCONTINUED | OUTPATIENT
Start: 2021-09-06 | End: 2021-09-06

## 2021-09-06 RX ORDER — INSULIN GLARGINE 100 [IU]/ML
45 INJECTION, SOLUTION SUBCUTANEOUS
Status: DISCONTINUED | OUTPATIENT
Start: 2021-09-06 | End: 2021-09-06

## 2021-09-06 RX ADMIN — AMPICILLIN SODIUM 2000 MG: 2 INJECTION, POWDER, FOR SOLUTION INTRAMUSCULAR; INTRAVENOUS at 21:18

## 2021-09-06 RX ADMIN — CEFEPIME HYDROCHLORIDE 1000 MG: 2 INJECTION, POWDER, FOR SOLUTION INTRAVENOUS at 05:55

## 2021-09-06 RX ADMIN — INSULIN LISPRO 4 UNITS: 100 INJECTION, SOLUTION INTRAVENOUS; SUBCUTANEOUS at 16:06

## 2021-09-06 RX ADMIN — Medication 12.5 MG: at 21:18

## 2021-09-06 RX ADMIN — AMPICILLIN SODIUM 2000 MG: 2 INJECTION, POWDER, FOR SOLUTION INTRAMUSCULAR; INTRAVENOUS at 16:00

## 2021-09-06 RX ADMIN — INSULIN LISPRO 3 UNITS: 100 INJECTION, SOLUTION INTRAVENOUS; SUBCUTANEOUS at 11:21

## 2021-09-06 RX ADMIN — INSULIN LISPRO 4 UNITS: 100 INJECTION, SOLUTION INTRAVENOUS; SUBCUTANEOUS at 08:30

## 2021-09-06 RX ADMIN — FUROSEMIDE 40 MG: 10 INJECTION, SOLUTION INTRAMUSCULAR; INTRAVENOUS at 09:00

## 2021-09-06 RX ADMIN — CEFTRIAXONE 2000 MG: 2 INJECTION, POWDER, FOR SOLUTION INTRAMUSCULAR; INTRAVENOUS at 14:20

## 2021-09-06 RX ADMIN — APIXABAN 5 MG: 5 TABLET, FILM COATED ORAL at 17:59

## 2021-09-06 RX ADMIN — AMLODIPINE BESYLATE 5 MG: 5 TABLET ORAL at 09:18

## 2021-09-06 RX ADMIN — PAROXETINE 20 MG: 20 TABLET, FILM COATED ORAL at 09:16

## 2021-09-06 RX ADMIN — APIXABAN 5 MG: 5 TABLET, FILM COATED ORAL at 09:14

## 2021-09-06 RX ADMIN — INSULIN GLARGINE 45 UNITS: 100 INJECTION, SOLUTION SUBCUTANEOUS at 21:18

## 2021-09-06 RX ADMIN — DOCUSATE SODIUM AND SENNOSIDES 1 TABLET: 8.6; 5 TABLET, FILM COATED ORAL at 21:18

## 2021-09-06 RX ADMIN — Medication 12.5 MG: at 09:18

## 2021-09-06 RX ADMIN — ACETAMINOPHEN 650 MG: 325 TABLET, FILM COATED ORAL at 21:23

## 2021-09-06 RX ADMIN — FUROSEMIDE 40 MG: 10 INJECTION, SOLUTION INTRAMUSCULAR; INTRAVENOUS at 16:07

## 2021-09-06 RX ADMIN — INSULIN LISPRO 1 UNITS: 100 INJECTION, SOLUTION INTRAVENOUS; SUBCUTANEOUS at 21:19

## 2021-09-06 NOTE — ASSESSMENT & PLAN NOTE
Lab Results   Component Value Date    EGFR 29 09/05/2021    EGFR 26 09/04/2021    EGFR 25 09/03/2021    CREATININE 2 21 (H) 09/05/2021    CREATININE 2 46 (H) 09/04/2021    CREATININE 2 49 (H) 09/03/2021     · Creatinine on admission 2 49; baseline around 2 10  · Likely in setting of CHF volume overload vs prerenal SEPSIS  · Monitor with IV Lasix

## 2021-09-06 NOTE — RESPIRATORY THERAPY NOTE
RT Protocol Note  Felicita Shepherd 73 y o  male MRN: [id number]  Unit/Bed#: -01 Encounter: [id number]    Assessment    Principal Problem:    Sepsis ([facility] [address] )  Active Problems:    Anemia    Type 2 diabetes mellitus (HCC)    Acute CHF (congestive heart failure) (HCC)    Hypertension    Acute kidney injury superimposed on chronic kidney disease (HCC)    Hypokalemia      Home Pulmonary Medications:  none       Past Medical History:   Diagnosis Date    [facility]     Diabetes mellitus ([facility] )     Hypertension      Social History     Socioeconomic History    Marital status: /Civil Union     Spouse name: Not on file    Number of children: Not on file    Years of education: Not on file    Highest education level: Not on file   Occupational History    Not on file   Tobacco Use    Smoking status: Former Smoker     Types: Cigarettes   Vaping Use    Vaping Use: Never used   Substance and Sexual Activity    Alcohol use: Not Currently    Drug use: Not on file    Sexual activity: Not on file   Other Topics Concern    Not on file   Social History Narrative    Not on file     Social Determinants of Health     Financial Resource Strain:     Difficulty of Paying Living Expenses:    Food Insecurity:     Worried About Running Out of Food in the Last Year:     [address] in the Last Year:    Transportation Needs:     Lack of Transportation (Medical):      Lack of Transportation (Non-Medical):    Physical Activity:     Days of Exercise per Week:     Minutes of Exercise per Session:    Stress:     Feeling of Stress :    Social Connections:     Frequency of Communication with Friends and Family:     Frequency of Social Gatherings with Friends and Family:     Attends Confucianist Services:     Active Member of Clubs or Organizations:     Attends Club or Organization Meetings:     Marital Status:    Intimate Partner Violence:     Fear of Current or Ex-Partner:     Emotionally Abused:     Physically Abused:     Sexually Abused:        Subjective         Objective    Physical Exam:   Assessment Type: Assess only  General Appearance: Sleeping  Respiratory Pattern: Spontaneous, Normal  Chest Assessment: Chest expansion symmetrical  Bilateral Breath Sounds: Clear, Diminished    Vitals:  Blood pressure (!) 112/45, pulse 63, temperature 98 1 °F (36 7 °C), temperature source Oral, resp  rate 17, height 5' 5" (1 651 m), weight 103 kg (227 lb 1 2 oz), SpO2 97 %            Imaging and other studies:     O2 Device: nasal cannula     Plan    Respiratory Plan: No distress/Pulmonary history, pt asleep with even non labored respirations, bilat clr but diminished bs, Spo2 97% on 2L o2 nasal cannula, pt has no pulmonary history nor uses any respiratory medications at home, dc per protocol

## 2021-09-06 NOTE — ASSESSMENT & PLAN NOTE
Patient reporting development of cough, weakness, chills, dyspnea, decreased appetite, nausea without vomiting, and fever of 101F at home today  No recent sick contacts, patient is fully vaccinated against COVID  · Sepsis criteria; Fever 101F measured at home, resp 24cpm in ED; chills, rigors, and SOB at home plus CBC left shift suggestive of possible infectious lung process superimposed on volume overload  · CXR BL lower lobe infiltrates   · 96% O2 1L NC, /49; monitor  · Lactic acid 2 6  · Troponin WNL  · Infectious workup and abx ordered in ED  · Procalcitonin 2 5  · Continue cefepime and vancomycin  · Given suggestive acute CHF exacerbation, stable BP, with lactic acid less than 4; holding IV fluids in setting of acute CHF  · Monitor BP and consider IVF infusion if needed for hypotension    Blood cultures 2/2 positive blood cultures for gram positive cocci in pairs and chains, now enterococcus faecalis pending speciation, can likely change to ampicillin once speciated  Echo showed:  Ejection fraction of 48%, grade 2 diastolic dysfunction, severe mitral valve stenosis,The valve overall appeared well seated however there was the suggestion that the valve strut extended into the LVOT impeding transmitral flow    Will order RAISSA  Will consult Cardiology

## 2021-09-06 NOTE — CONSULTS
Consultation - Infectious Disease   James Newell 73 y o  male MRN: [id number]  Unit/Bed#: -01 Encounter: [id number]      IMPRESSION & RECOMMENDATIONS:   1  Enterococcal bacteremia-in the setting of a patient with a redo bioprosthetic aortic valve  Fortunately the organism is relatively sensitive  Consideration for the possibility of prosthetic valve endocarditis  Transthoracic echocardiogram with an apparent displaced stress at but no valvular vegetation reported or dehiscence  It is concerning the patient has developed congestive heart failure  No definitive sources appreciated although the patient did have dysuria while in the hospital previously and was apparently treated for UTI  Current urinalysis not consistent with UTI   -discontinue vancomycin cefepime  -begin ampicillin 2 g IV Q 6 hours and ceftriaxone 2 g IV q 12 hours  -check transesophageal echocardiogram  -consult cardiology  -check CT the abdomen pelvis with oral contrast only  -recheck CBC with diff and CMP  -recheck blood cultures to confirm clearance of the bacteremia  -additional workup as needed    2  Acute congestive heart failure  In the setting of 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  The patient recently was admitted at the [facility] with the same problem and apparently underwent coronary angiogram   -consult cardiology  -check transesophageal echocardiogram as above  -additional workup as needed    3  Acute hypoxic respiratory failure-suspect all secondary to acute congestive heart failure  No clear clinical or radiographic pneumonia    4  Acute kidney injury-complicating chronic kidney disease  Baseline creatinine around 2 1 and creatinine has bumped  Suspect primarily a pre renal issue  Consideration for the possibility of another etiology    -recheck BMP  -volume management  -dose adjusted antibiotics    Have discussed the above management plan in detail with the primary service    Extensive review of the medical records in epic including review of the notes, radiographs, and laboratory results     HISTORY OF PRESENT ILLNESS:  Reason for Consult:  Enterococcal bacteremia  HPI: Poornima Garibay is a 73y o  year old male with diabetes mellitus, congestive heart failure, hypertension, bioprosthetic aortic valve replacement admitted to [facility] with worsening shortness of breath, fever and chills who I am asked to assist with management of Gram-positive bacteremia  Patient has had a history of aortic valve replacement [facility]  Five years ago he underwent redo bioprosthetic aortic valve replacement at [facility] when he developed a infected valve from an [employer]  More recently the patient had been admitted to [facility] in mid August with congestive heart failure  During that hospital stay he said he had 1 bout in which they told me the fever and at that time had burning urine and was treated with a course of antibiotics  His shortness of breath in the setting of congestive heart failure improved but apparently the patient developed right ankle gout during that hospital stay  He ended up being admitted for 2 weeks  He was discharged home last week and was brought up to this area by his grandson to visit family  He began developing increasing shortness of breath, fever and chills in the day or 2 prior to this admission and therefore came to the ER for further evaluation  His temperature was 101° at home  Emergency department the patient was found to be afebrile with mild leukocytosis with left shift  He was diagnosed with sepsis with acute congestive heart failure, had blood cultures obtained, was started on vancomycin cefepime and admitted for further management  He was also noted to have acute kidney injury  He was thought perhaps to have pneumonia    His respiratory symptoms have improved and his temperatures come down and he has remained hemodynamically stable  His blood cultures have now grown Enterococcus faecalis  He denies having any dysuria or hematuria, denies any increased cough or shortness of breath, denies any sore throat rhinorrhea or nasal congestion, denies any nausea vomiting or diarrhea, denies any new rash or skin lesions, denies any new joint or muscle pains  REVIEW OF SYSTEMS:  A complete review of systems is negative other than that noted in the HPI  PAST MEDICAL HISTORY:  Past Medical History:   Diagnosis Date    [facility]F [facility]congest[facility] failure) ([facility] )     Diabetes mellitus ([facility] )     Hypertension      No past surgical history on file  FAMILY HISTORY:  Non-contributory    SOCIAL HISTORY:  Social History   Social History     Substance and Sexual Activity   Alcohol Use Not Currently     Social History     Substance and Sexual Activity   Drug Use Not on file     Social History     Tobacco Use   Smoking Status Former Smoker    Types: Cigarettes       ALLERGIES:  Allergies   Allergen Reactions    Nitroglycerin Other (See Comments)     Patient states "he is unsure of reaction"    Statins Other (See Comments)     Patient states "he is unsure of reaction"       MEDICATIONS:  All current active medications have been reviewed    Antibiotics:  Vancomycin cefepime 4    PHYSICAL EXAM:  Temp:  [97 8 °F (36 6 °C)-98 2 °F (36 8 °C)] 97 8 °F (36 6 °C)  HR:  [63-67] 67  Resp:  [17-21] 21  BP: (111-142)/(45-65) 142/65  SpO2:  [92 %-99 %] 99 %  Temp (24hrs), Av °F (36 7 °C), Min:97 8 °F (36 6 °C), Max:98 2 °F (36 8 °C)  Current: Temperature: 97 8 °F (36 6 °C)    Intake/Output Summary (Last 24 hours) at 2021 1337  Last data filed at 2021 1013  Gross per 24 hour   Intake 300 ml   Output 1360 ml   Net -1060 ml       General Appearance:  Appearing well, nontoxic, and in no distress   Head:  Normocephalic, without obvious abnormality, atraumatic   Eyes:  Conjunctiva pink and sclera anicteric, both eyes   Nose: Nares normal, mucosa normal, no drainage   Throat: Oropharynx moist without lesions   Neck: Supple, symmetrical, no adenopathy, no tenderness/mass/nodules   Back:   Symmetric, no curvature, ROM normal, no CVA tenderness   Lungs:   Clear to auscultation bilaterally, respirations unlabored   Chest Wall:  No tenderness or deformity   Heart:  RRR; harsh systolic murmur, rub or gallop   Abdomen:   Soft, non-tender, non-distended, positive bowel sounds    Extremities: No cyanosis, clubbing or edema   Skin: No rashes or lesions  No draining wounds noted  Lymph nodes: Cervical, supraclavicular nodes normal   Neurologic: Alert and oriented times 3, extremity strength 5/5 and symmetric       LABS, IMAGING, & OTHER STUDIES:  Lab Results:  I have personally reviewed pertinent labs    Results from last 7 days   Lab Units 09/05/21  0509 09/04/21  0537 09/03/21  1741   WBC Thousand/uL 6 73 10 15 9 61   HEMOGLOBIN g/dL 9 2* 9 3* 9 8*   PLATELETS Thousands/uL 179 160 182     Results from last 7 days   Lab Units 09/05/21  0509 09/04/21  0537 09/03/21  1741   SODIUM mmol/L 133* 133* 136   POTASSIUM mmol/L 3 2* 3 7 3 1*   CHLORIDE mmol/L 96* 97* 97*   CO2 mmol/L 30 30 31   BUN mg/dL 48* 44* 45*   CREATININE mg/dL 2 21* 2 46* 2 49*   EGFR ml/min/1 73sq m 29 26 25   CALCIUM mg/dL 7 8* 7 9* 8 4   AST U/L  --   --  34   ALT U/L  --   --  36   ALK PHOS U/L  --   --  84     Results from last 7 days   Lab Units 09/04/21  0859 09/03/21  1741   BLOOD CULTURE   --  Enterococcus faecalis*  Enterococcus faecalis*   GRAM STAIN RESULT   --  Gram positive cocci in pairs and chains*  Gram positive cocci in pairs and chains*   MRSA CULTURE ONLY  No Methicillin Resistant Staphlyococcus aureus (MRSA) isolated  --      Results from last 7 days   Lab Units 09/05/21  0509 09/03/21  2337   PROCALCITONIN ng/ml 3 63* 2 25*        echocardiogram-bioprosthetic aortic valve with valve straight extending into the left ventricular outflow tract impeding mitral flow  Severe mitral valve stenosis            Imaging Studies:     Chest x-ray-basilar atelectasis on left with a small effusion    Opacification at the right base    Images personally reviewed by me in PACS

## 2021-09-06 NOTE — ASSESSMENT & PLAN NOTE
No results found for: HGBA1C    Recent Labs     09/05/21  1037 09/05/21  1552 09/05/21  2155 09/06/21  1103   POCGLU 343* 319* 277* 268*     (P) 285 7  · A1c 7 5 on 8/21  · Patient not prescribed outpatient diabetic medications  · Mealtime SSI ordered  · Monitor blood glucose   · Long-acting insulin increased to 45 units, continue Humalog 30 units with meals

## 2021-09-06 NOTE — PROGRESS NOTES
[address] [facility]  Progress Note Ming Mt 1950, 73 y o  male MRN: [id number]  Unit/Bed#: -Trav Encounter: [id number]  Primary Care Provider: No primary care provider on file  Date and time admitted to hospital: 9/3/2021  5:14 PM    * Sepsis [facility])  Assessment & Plan  Patient reporting development of cough, weakness, chills, dyspnea, decreased appetite, nausea without vomiting, and fever of 101F at home today  No recent sick contacts, patient is fully vaccinated against COVID  · Sepsis criteria; Fever 101F measured at home, resp 24cpm in ED; chills, rigors, and SOB at home plus CBC left shift suggestive of possible infectious lung process superimposed on volume overload  · CXR BL lower lobe infiltrates   · 96% O2 1L NC, /49; monitor  · Lactic acid 2 6  · Troponin WNL  · Infectious workup and abx ordered in ED  · Procalcitonin 2 5  · Continue cefepime and vancomycin  · Given suggestive acute CHF exacerbation, stable BP, with lactic acid less than 4; holding IV fluids in setting of acute CHF  · Monitor BP and consider IVF infusion if needed for hypotension    Blood cultures 2/2 positive blood cultures for gram positive cocci in pairs and chains, now enterococcus faecalis pending speciation, can likely change to ampicillin once speciated  Echo showed:  Ejection fraction of 52%, grade 2 diastolic dysfunction, severe mitral valve stenosis,The valve overall appeared well seated however there was the suggestion that the valve strut extended into the LVOT impeding transmitral flow    Will order RAISSA  Will consult Cardiology      Acute respiratory failure [facility])  Assessment & Plan  Secondary to CHF and pneumonia    Hypokalemia  Assessment & Plan  · Replete prn    Acute kidney injury superimposed on chronic kidney disease [facility])  Assessment & Plan  Lab Results   Component Value Date    EGFR 29 09/05/2021    EGFR 26 09/04/2021    EGFR 25 09/03/2021    CREATININE 2 21 (H) 09/05/2021 CREATININE 2 46 (H) 09/04/2021    CREATININE 2 49 (H) 09/03/2021     · Creatinine on admission 2 49; baseline around 2 10  · Likely in setting of CHF volume overload vs prerenal SEPSIS  · Monitor with IV Lasix        Hypertension  Assessment & Plan  · Relatively well controlled  · Continue home HTN medications      Acute CHF (congestive heart failure) ([address] )  Assessment & Plan  Wt Readings from Last 3 Encounters:   09/05/21 103 kg (227 lb 1 2 oz)       · CXR BL lower lobe infiltrates and BNP 3286 suggestive of volume overload  Continue Lasix 40 IV b i d  Cardiology consult  · 40mg IV Lasix ordered  · I/O's / daily weights  · Reevaluate need for further diuretics     -3 1 L since admission    Type 2 diabetes mellitus [facility])  Assessment & Plan  No results found for: HGBA1C    Recent Labs     09/05/21  1037 09/05/21  1552 09/05/21  2155 09/06/21  1103   POCGLU 343* 319* 277* 268*     (P) 285 7  · A1c 7 5 on 8/21  · Patient not prescribed outpatient diabetic medications  · Mealtime SSI ordered  · Monitor blood glucose   · Long-acting insulin increased to 45 units, continue Humalog 30 units with meals          Anemia  Assessment & Plan  Stable  noSigns of bleeding  Continue to monitor        VTE Pharmacologic Prophylaxis:   Pharmacologic: Apixaban (Eliquis)  Mechanical VTE Prophylaxis in Place: Yes    Patient Centered Rounds: I have performed bedside rounds with nursing staff today  Discussions with Specialists or Other Care Team Provider:  Cm and nursing    Education and Discussions with Family / Patient:  Patient, patient did not want me to call any family members    Time Spent for Care: 45 minutes  More than 50% of total time spent on counseling and coordination of care as described above      Current Length of Stay: 2 day(s)    Current Patient Status: Inpatient   Certification Statement: The patient will continue to require additional inpatient hospital stay due to IV antibiotics    Discharge Plan:  48-72 hours    Code Status: Level 1 - Full Code      Subjective:   Patient seen examined bedside  No acute events denies any chest pain, shortness of breath, abdominal pain    Objective:     Vitals:   Temp (24hrs), Av °F (36 7 °C), Min:97 8 °F (36 6 °C), Max:98 2 °F (36 8 °C)    Temp:  [97 8 °F (36 6 °C)-98 2 °F (36 8 °C)] 97 8 °F (36 6 °C)  HR:  [63-67] 67  Resp:  [17-21] 21  BP: (111-142)/(45-65) 142/65  SpO2:  [92 %-99 %] 99 %  Body mass index is 37 79 kg/m²  Input and Output Summary (last 24 hours): Intake/Output Summary (Last 24 hours) at 2021 1305  Last data filed at 2021 1013  Gross per 24 hour   Intake 600 ml   Output 1960 ml   Net -1360 ml       Physical Exam:     Physical Exam  Vitals and nursing note reviewed  Constitutional:       General: He is not in acute distress  Appearance: Normal appearance  He is obese  HENT:      Head: Normocephalic  Nose: Nose normal       Mouth/Throat:      Mouth: Mucous membranes are moist    Eyes:      Conjunctiva/sclera: Conjunctivae normal    Cardiovascular:      Rate and Rhythm: Normal rate  Heart sounds: Murmur heard  Pulmonary:      Effort: Pulmonary effort is normal  No respiratory distress  Breath sounds: No wheezing  Comments: Amauri breath sounds right lower base  Abdominal:      General: There is no distension  Tenderness: There is no abdominal tenderness  There is no guarding  Musculoskeletal:         General: No swelling  Right lower leg: No edema  Skin:     General: Skin is warm  Neurological:      General: No focal deficit present  Mental Status: He is alert and oriented to person, place, and time     Psychiatric:         Mood and Affect: Mood normal            Additional Data:     Labs:    Results from last 7 days   Lab Units 21  0509 21  0537   WBC Thousand/uL 6 73 10 15   HEMOGLOBIN g/dL 9 2* 9 3*   HEMATOCRIT % 28 4* 28 3*   PLATELETS Thousands/uL 179 160   BANDS PCT %  --  2 NEUTROS PCT % 70  --    LYMPHS PCT % 9*  --    LYMPHO PCT %  --  3*   MONOS PCT % 15*  --    MONO PCT %  --  5   EOS PCT % 4 0     Results from last 7 days   Lab Units 09/05/21  0509 09/03/21  1741   SODIUM mmol/L 133* 136   POTASSIUM mmol/L 3 2* 3 1*   CHLORIDE mmol/L 96* 97*   CO2 mmol/L 30 31   BUN mg/dL 48* 45*   CREATININE mg/dL 2 21* 2 49*   ANION GAP mmol/L 7 8   CALCIUM mg/dL 7 8* 8 4   ALBUMIN g/dL  --  2 6*   TOTAL BILIRUBIN mg/dL  --  0 51   ALK PHOS U/L  --  84   ALT U/L  --  36   AST U/L  --  34   GLUCOSE RANDOM mg/dL 212* 142*     Results from last 7 days   Lab Units 09/03/21  1741   INR  1 75*     Results from last 7 days   Lab Units 09/06/21  1103 09/05/21  2155 09/05/21  1552 09/05/21  1037 09/05/21  0625 09/04/21  2229 09/04/21  1624 09/04/21  1051 09/04/21  0705 09/03/21  2203   POC GLUCOSE mg/dl 268* 277* 319* 343* 241* 261* 369* 336* 244* 199*         Results from last 7 days   Lab Units 09/05/21  0509 09/03/21  2337 09/03/21 2015 09/03/21  1741   LACTIC ACID mmol/L  --   --  1 4 2 6*   PROCALCITONIN ng/ml 3 63* 2 25*  --   --            * I Have Reviewed All Lab Data Listed Above  * Additional Pertinent Lab Tests Reviewed:  [place] [address] Admission Reviewed    Imaging:    Imaging Reports Reviewed Today Include:  Chest x-ray  Imaging Personally Reviewed by Myself Includes:  None    Recent Cultures (last 7 days):     Results from last 7 days   Lab Units 09/03/21  1741   BLOOD CULTURE  Enterococcus faecalis*  Enterococcus faecalis*   GRAM STAIN RESULT  Gram positive cocci in pairs and chains*  Gram positive cocci in pairs and chains*       Last 24 Hours Medication List:   Current Facility-Administered Medications   Medication Dose Route Frequency Provider Last Rate    acetaminophen  650 mg Oral Q6H PRN Nikki De Souza PA-C      amLODIPine  5 mg Oral Daily Ernesta [place], [place]      apixaban  5 mg Oral BID [facility], PA-C      cefepime  1,000 mg Intravenous Q12H Salina Yuan PA-C 1,000 mg (09/06/21 6855)    furosemide  40 mg Intravenous BID (diuretic) Vladimir Burns DO      insulin glargine  45 Units Subcutaneous HS Nitin Ross MD      insulin lispro  1-6 Units Subcutaneous TID AC [place], [place]      insulin lispro  1-6 Units Subcutaneous HS [place], [place]      insulin lispro  30 Units Subcutaneous TID With Meals Vladimir Burns DO      metoprolol tartrate  12 5 mg Oral Q12H [facility] Vladimir Burns DO      ondansetron  4 mg Intravenous Q6H PRN [facility]      PARoxetine  20 mg Oral Daily [place], [place]      senna-docusate sodium  1 tablet Oral HS Vladimir Burns DO      vancomycin  20 mg/kg (Adjusted) Intravenous Q24H [facility] 1,500 mg (09/04/21 1911)        Today, Patient Was Seen By: Zohreh Oneil MD    ** Please Note: Dictation voice to text software may have been used in the creation of this document   **

## 2021-09-06 NOTE — ASSESSMENT & PLAN NOTE
Wt Readings from Last 3 Encounters:   09/05/21 103 kg (227 lb 1 2 oz)       · CXR BL lower lobe infiltrates and BNP 3286 suggestive of volume overload  Continue Lasix 40 IV b i d    Cardiology consult  · 40mg IV Lasix ordered  · I/O's / daily weights  · Reevaluate need for further diuretics     -3 1 L since admission

## 2021-09-07 ENCOUNTER — TELEPHONE (OUTPATIENT)
Dept: SURGERY | Facility: HOSPITAL | Age: 71
End: 2021-09-07

## 2021-09-07 ENCOUNTER — APPOINTMENT (INPATIENT)
Dept: CT IMAGING | Facility: HOSPITAL | Age: 71
DRG: 871 | End: 2021-09-07
Payer: MEDICARE

## 2021-09-07 ENCOUNTER — APPOINTMENT (INPATIENT)
Dept: INTERVENTIONAL RADIOLOGY/VASCULAR | Facility: HOSPITAL | Age: 71
DRG: 871 | End: 2021-09-07
Attending: FAMILY MEDICINE
Payer: MEDICARE

## 2021-09-07 LAB
ANION GAP SERPL CALCULATED.3IONS-SCNC: 4 MMOL/L (ref 4–13)
BASOPHILS # BLD AUTO: 0.06 THOUSANDS/ΜL (ref 0–0.1)
BASOPHILS NFR BLD AUTO: 1 % (ref 0–1)
BUN SERPL-MCNC: 40 MG/DL (ref 5–25)
CALCIUM SERPL-MCNC: 7.8 MG/DL (ref 8.3–10.1)
CHLORIDE SERPL-SCNC: 99 MMOL/L (ref 100–108)
CO2 SERPL-SCNC: 32 MMOL/L (ref 21–32)
CREAT SERPL-MCNC: 1.73 MG/DL (ref 0.6–1.3)
EOSINOPHIL # BLD AUTO: 0.26 THOUSAND/ΜL (ref 0–0.61)
EOSINOPHIL NFR BLD AUTO: 5 % (ref 0–6)
ERYTHROCYTE [DISTWIDTH] IN BLOOD BY AUTOMATED COUNT: 13.6 % (ref 11.6–15.1)
GFR SERPL CREATININE-BSD FRML MDRD: 39 ML/MIN/1.73SQ M
GLUCOSE SERPL-MCNC: 136 MG/DL (ref 65–140)
GLUCOSE SERPL-MCNC: 183 MG/DL (ref 65–140)
GLUCOSE SERPL-MCNC: 186 MG/DL (ref 65–140)
GLUCOSE SERPL-MCNC: 272 MG/DL (ref 65–140)
GLUCOSE SERPL-MCNC: 296 MG/DL (ref 65–140)
HCT VFR BLD AUTO: 28.8 % (ref 36.5–49.3)
HGB BLD-MCNC: 9.3 G/DL (ref 12–17)
IMM GRANULOCYTES # BLD AUTO: 0.06 THOUSAND/UL (ref 0–0.2)
IMM GRANULOCYTES NFR BLD AUTO: 1 % (ref 0–2)
LYMPHOCYTES # BLD AUTO: 0.55 THOUSANDS/ΜL (ref 0.6–4.47)
LYMPHOCYTES NFR BLD AUTO: 11 % (ref 14–44)
MCH RBC QN AUTO: 28.7 PG (ref 26.8–34.3)
MCHC RBC AUTO-ENTMCNC: 32.3 G/DL (ref 31.4–37.4)
MCV RBC AUTO: 89 FL (ref 82–98)
MONOCYTES # BLD AUTO: 0.72 THOUSAND/ΜL (ref 0.17–1.22)
MONOCYTES NFR BLD AUTO: 14 % (ref 4–12)
NEUTROPHILS # BLD AUTO: 3.54 THOUSANDS/ΜL (ref 1.85–7.62)
NEUTS SEG NFR BLD AUTO: 68 % (ref 43–75)
NRBC BLD AUTO-RTO: 0 /100 WBCS
PLATELET # BLD AUTO: 182 THOUSANDS/UL (ref 149–390)
PMV BLD AUTO: 9 FL (ref 8.9–12.7)
POTASSIUM SERPL-SCNC: 3.3 MMOL/L (ref 3.5–5.3)
RBC # BLD AUTO: 3.24 MILLION/UL (ref 3.88–5.62)
SODIUM SERPL-SCNC: 135 MMOL/L (ref 136–145)
WBC # BLD AUTO: 5.19 THOUSAND/UL (ref 4.31–10.16)

## 2021-09-07 PROCEDURE — 85025 COMPLETE CBC W/AUTO DIFF WBC: CPT | Performed by: INTERNAL MEDICINE

## 2021-09-07 PROCEDURE — 82948 REAGENT STRIP/BLOOD GLUCOSE: CPT

## 2021-09-07 PROCEDURE — 99222 1ST HOSP IP/OBS MODERATE 55: CPT | Performed by: INTERNAL MEDICINE

## 2021-09-07 PROCEDURE — 74176 CT ABD & PELVIS W/O CONTRAST: CPT

## 2021-09-07 PROCEDURE — G1004 CDSM NDSC: HCPCS

## 2021-09-07 PROCEDURE — 99233 SBSQ HOSP IP/OBS HIGH 50: CPT | Performed by: FAMILY MEDICINE

## 2021-09-07 PROCEDURE — 80048 BASIC METABOLIC PNL TOTAL CA: CPT | Performed by: INTERNAL MEDICINE

## 2021-09-07 PROCEDURE — 87040 BLOOD CULTURE FOR BACTERIA: CPT | Performed by: INTERNAL MEDICINE

## 2021-09-07 PROCEDURE — 99233 SBSQ HOSP IP/OBS HIGH 50: CPT | Performed by: INTERNAL MEDICINE

## 2021-09-07 RX ORDER — POTASSIUM CHLORIDE 20 MEQ/1
20 TABLET, EXTENDED RELEASE ORAL 2 TIMES DAILY
Status: DISCONTINUED | OUTPATIENT
Start: 2021-09-07 | End: 2021-09-09 | Stop reason: HOSPADM

## 2021-09-07 RX ORDER — FUROSEMIDE 10 MG/ML
40 INJECTION INTRAMUSCULAR; INTRAVENOUS DAILY
Status: DISCONTINUED | OUTPATIENT
Start: 2021-09-08 | End: 2021-09-09 | Stop reason: HOSPADM

## 2021-09-07 RX ORDER — POTASSIUM CHLORIDE 20 MEQ/1
40 TABLET, EXTENDED RELEASE ORAL ONCE
Status: COMPLETED | OUTPATIENT
Start: 2021-09-07 | End: 2021-09-07

## 2021-09-07 RX ADMIN — AMPICILLIN SODIUM 2000 MG: 2 INJECTION, POWDER, FOR SOLUTION INTRAMUSCULAR; INTRAVENOUS at 14:35

## 2021-09-07 RX ADMIN — AMPICILLIN SODIUM 2000 MG: 2 INJECTION, POWDER, FOR SOLUTION INTRAMUSCULAR; INTRAVENOUS at 20:43

## 2021-09-07 RX ADMIN — Medication 12.5 MG: at 20:43

## 2021-09-07 RX ADMIN — AMLODIPINE BESYLATE 5 MG: 5 TABLET ORAL at 08:25

## 2021-09-07 RX ADMIN — DOCUSATE SODIUM AND SENNOSIDES 1 TABLET: 8.6; 5 TABLET, FILM COATED ORAL at 22:51

## 2021-09-07 RX ADMIN — PAROXETINE 20 MG: 20 TABLET, FILM COATED ORAL at 08:25

## 2021-09-07 RX ADMIN — APIXABAN 5 MG: 5 TABLET, FILM COATED ORAL at 17:18

## 2021-09-07 RX ADMIN — AMPICILLIN SODIUM 2000 MG: 2 INJECTION, POWDER, FOR SOLUTION INTRAMUSCULAR; INTRAVENOUS at 08:23

## 2021-09-07 RX ADMIN — INSULIN LISPRO 4 UNITS: 100 INJECTION, SOLUTION INTRAVENOUS; SUBCUTANEOUS at 12:46

## 2021-09-07 RX ADMIN — CEFTRIAXONE 2000 MG: 2 INJECTION, POWDER, FOR SOLUTION INTRAMUSCULAR; INTRAVENOUS at 13:40

## 2021-09-07 RX ADMIN — POTASSIUM CHLORIDE 20 MEQ: 1500 TABLET, EXTENDED RELEASE ORAL at 17:18

## 2021-09-07 RX ADMIN — APIXABAN 5 MG: 5 TABLET, FILM COATED ORAL at 08:22

## 2021-09-07 RX ADMIN — IOHEXOL 50 ML: 240 INJECTION, SOLUTION INTRATHECAL; INTRAVASCULAR; INTRAVENOUS; ORAL at 00:21

## 2021-09-07 RX ADMIN — AMPICILLIN SODIUM 2000 MG: 2 INJECTION, POWDER, FOR SOLUTION INTRAMUSCULAR; INTRAVENOUS at 01:31

## 2021-09-07 RX ADMIN — INSULIN LISPRO 4 UNITS: 100 INJECTION, SOLUTION INTRAVENOUS; SUBCUTANEOUS at 17:22

## 2021-09-07 RX ADMIN — CEFTRIAXONE 2000 MG: 2 INJECTION, POWDER, FOR SOLUTION INTRAMUSCULAR; INTRAVENOUS at 02:24

## 2021-09-07 RX ADMIN — POTASSIUM CHLORIDE 40 MEQ: 1500 TABLET, EXTENDED RELEASE ORAL at 09:01

## 2021-09-07 RX ADMIN — FUROSEMIDE 40 MG: 10 INJECTION, SOLUTION INTRAMUSCULAR; INTRAVENOUS at 08:25

## 2021-09-07 RX ADMIN — Medication 12.5 MG: at 08:25

## 2021-09-07 RX ADMIN — INSULIN GLARGINE 45 UNITS: 100 INJECTION, SOLUTION SUBCUTANEOUS at 22:51

## 2021-09-07 NOTE — ASSESSMENT & PLAN NOTE
No results found for: HGBA1C    Recent Labs     09/06/21  1605 09/06/21  2118 09/07/21  0622 09/07/21  1213   POCGLU 289* 181* 183* 296*     (P) [phone]  · A1c 7 5 on 8/21  · Patient not prescribed outpatient diabetic medications  · Mealtime SSI ordered  · Monitor blood glucose   · Long-acting insulin increased to 45 units, continue Humalog 33 units with meals

## 2021-09-07 NOTE — ASSESSMENT & PLAN NOTE
Patient reporting development of cough, weakness, chills, dyspnea, decreased appetite, nausea without vomiting, and fever of 101F at home today  No recent sick contacts, patient is fully vaccinated against COVID  · Sepsis criteria; Fever 101F measured at home, resp 24cpm in ED; chills, rigors, and SOB at home plus CBC left shift suggestive of possible infectious lung process superimposed on volume overload  · CXR BL lower lobe infiltrates   · 96% O2 1L NC, /49; monitor  · Lactic acid 2 6-resolved  · Troponin WNL  · Procalcitonin 2 5  · Initially on cefepime and vancomycin  Switch tohigh-dose ampicillin and ceftriaxone on 09/06/2021 as per ID recommendation  · Blood cultures grow Enterococcus faecalis  · Pending repeat blood cultures  · ID following      Echo showed:  Ejection fraction of 13%, grade 2 diastolic dysfunction, severe mitral valve stenosis,The valve overall appeared well seated however there was the suggestion that the valve strut extended into the LVOT impeding transmitral flow    Will order RAISSA  Pending cardiology consult

## 2021-09-07 NOTE — PROGRESS NOTES
[address] [facility]  Progress Note Derrick Driver 1950, 73 y o  male MRN: [id number]  Unit/Bed#: MS [place] Encounter: [id number]  Primary Care Provider: No primary care provider on file  Date and time admitted to hospital: 9/3/2021  5:14 PM    * Sepsis [facility])  Assessment & Plan  Patient reporting development of cough, weakness, chills, dyspnea, decreased appetite, nausea without vomiting, and fever of 101F at home today  No recent sick contacts, patient is fully vaccinated against COVID  · Sepsis criteria; Fever 101F measured at home, resp 24cpm in ED; chills, rigors, and SOB at home plus CBC left shift suggestive of possible infectious lung process superimposed on volume overload  · CXR BL lower lobe infiltrates   · 96% O2 1L NC, /49; monitor  · Lactic acid 2 6-resolved  · Troponin WNL  · Procalcitonin 2 5  · Initially on cefepime and vancomycin  Switch tohigh-dose ampicillin and ceftriaxone on 09/06/2021 as per ID recommendation  · Blood cultures grow Enterococcus faecalis  · Pending repeat blood cultures  · ID following      Echo showed:  Ejection fraction of 88%, grade 2 diastolic dysfunction, severe mitral valve stenosis,The valve overall appeared well seated however there was the suggestion that the valve strut extended into the LVOT impeding transmitral flow  Will order RAISSA  Pending cardiology consult      Acute respiratory failure [facility])  Assessment & Plan  Secondary to CHF and pneumonia  O2 p r n   Keep sats above 92%    Hypokalemia  Assessment & Plan  · Replete prn    Acute kidney injury superimposed on chronic kidney disease [facility])  Assessment & Plan  Lab Results   Component Value Date    EGFR 39 09/07/2021    EGFR 29 09/05/2021    EGFR 26 09/04/2021    CREATININE 1 73 (H) 09/07/2021    CREATININE 2 21 (H) 09/05/2021    CREATININE 2 46 (H) 09/04/2021     · Creatinine on admission 2 49; baseline around 2 10  · Likely in setting of CHF volume overload   · Improving with diuresis        Hypertension  Assessment & Plan  · Relatively well controlled  · Continue home HTN medications      Acute CHF (congestive heart failure) ([facility] [address] )  Assessment & Plan  Wt Readings from Last 3 Encounters:   21 103 kg (227 lb 1 2 oz)       · CXR BL lower lobe infiltrates and BNP 3286 suggestive of volume overload  Continue Lasix 40 IV b i d  Cardiology consult  · I/O's / daily weights    -4 5 L since admission  Pending cardiology consult    Type 2 diabetes mellitus [facility])  Assessment & Plan  No results found for: HGBA1C    Recent Labs     21  1605 21  2118 21  0622 21  1213   POCGLU 289* 181* 183* 296*     (P) 903 [phone]  · A1c 7 5 on   · Patient not prescribed outpatient diabetic medications  · Mealtime SSI ordered  · Monitor blood glucose   · Long-acting insulin increased to 45 units, continue Humalog 33 units with meals          Anemia  Assessment & Plan  Stable  No signs of bleeding  Continue to monitor          VTE Pharmacologic Prophylaxis:   Pharmacologic: Apixaban (Eliquis)  Mechanical VTE Prophylaxis in Place: Yes    Patient Centered Rounds: I have performed bedside rounds with nursing staff today  Discussions with Specialists or Other Care Team Provider:  Cm and nursing    Education and Discussions with Family / Patient:  Patient    Time Spent for Care: 30 minutes  More than 50% of total time spent on counseling and coordination of care as described above  Current Length of Stay: 3 day(s)    Current Patient Status: Inpatient   Certification Statement: The patient will continue to require additional inpatient hospital stay due to As above    Discharge Plan:  Pending hospital    Code Status: Level 1 - Full Code      Subjective:   Patient seen examined bedside    No acute events denies any chest pain, shortness of breath, abdominal pain    Objective:     Vitals:   Temp (24hrs), Av 8 °F (36 6 °C), Min:97 6 °F (36 4 °C), Max:98 1 °F (36 7 °C)    Temp:  [97 6 °F (36 4 °C)-98 1 °F (36 7 °C)] 97 8 °F (36 6 °C)  HR:  [65-77] 77  Resp:  [17-20] 20  BP: (125-145)/(49-75) 145/63  SpO2:  [95 %-99 %] 99 %  Body mass index is 37 79 kg/m²  Input and Output Summary (last 24 hours): Intake/Output Summary (Last 24 hours) at 9/7/2021 1311  Last data filed at 9/7/2021 1113  Gross per 24 hour   Intake 520 ml   Output 2450 ml   Net -1930 ml       Physical Exam:     Physical Exam  Vitals and nursing note reviewed  Constitutional:       General: He is not in acute distress  Appearance: Normal appearance  He is obese  HENT:      Head: Normocephalic  Nose: Nose normal       Mouth/Throat:      Mouth: Mucous membranes are moist    Eyes:      Conjunctiva/sclera: Conjunctivae normal    Cardiovascular:      Rate and Rhythm: Normal rate  Heart sounds: Murmur heard  Pulmonary:      Effort: Pulmonary effort is normal  No respiratory distress  Breath sounds: Normal breath sounds  No wheezing  Abdominal:      General: There is no distension  Tenderness: There is no abdominal tenderness  There is no guarding  Musculoskeletal:         General: No swelling  Right lower leg: No edema  Skin:     General: Skin is warm  Neurological:      General: No focal deficit present  Mental Status: He is alert and oriented to person, place, and time     Psychiatric:         Mood and Affect: Mood normal            Additional Data:     Labs:    Results from last 7 days   Lab Units 09/07/21  0437 09/04/21  0537   WBC Thousand/uL 5 19 10 15   HEMOGLOBIN g/dL 9 3* 9 3*   HEMATOCRIT % 28 8* 28 3*   PLATELETS Thousands/uL 182 160   BANDS PCT %  --  2   NEUTROS PCT % 68  --    LYMPHS PCT % 11*  --    LYMPHO PCT %  --  3*   MONOS PCT % 14*  --    MONO PCT %  --  5   EOS PCT % 5 0     Results from last 7 days   Lab Units 09/07/21  0437 09/03/21  1741   SODIUM mmol/L 135* 136   POTASSIUM mmol/L 3 3* 3 1*   CHLORIDE mmol/L 99* 97*   CO2 mmol/L 32 31 BUN mg/dL 40* 45*   CREATININE mg/dL 1 73* 2 49*   ANION GAP mmol/L 4 8   CALCIUM mg/dL 7 8* 8 4   ALBUMIN g/dL  --  2 6*   TOTAL BILIRUBIN mg/dL  --  0 51   ALK PHOS U/L  --  84   ALT U/L  --  36   AST U/L  --  34   GLUCOSE RANDOM mg/dL 186* 142*     Results from last 7 days   Lab Units 09/03/21  1741   INR  1 75*     Results from last 7 days   Lab Units 09/07/21  1213 09/07/21  0622 09/06/21  2118 09/06/21  1605 09/06/21  1103 09/05/21  2155 09/05/21  1552 09/05/21  1037 09/05/21  0625 09/04/21  2229 09/04/21  1624 09/04/21  1051   POC GLUCOSE mg/dl 296* 183* 181* 289* 268* 277* 319* 343* 241* 261* 369* 336*         Results from last 7 days   Lab Units 09/05/21  0509 09/03/21  2337 09/03/21  2015 09/03/21  1741   LACTIC ACID mmol/L  --   --  1 4 2 6*   PROCALCITONIN ng/ml 3 63* 2 25*  --   --            * I Have Reviewed All Lab Data Listed Above  * Additional Pertinent Lab Tests Reviewed: [place] [address] Admission Reviewed    Imaging:    Imaging Reports Reviewed Today Include:  CT scan of the abdomen  Imaging Personally Reviewed by Myself Includes:  None    Recent Cultures (last 7 days):     Results from last 7 days   Lab Units 09/07/21  0437 09/03/21  1741   BLOOD CULTURE  Received in Microbiology Lab  Culture in Progress  Received in Microbiology Lab  Culture in Progress   Enterococcus faecalis*  Enterococcus faecalis*   GRAM STAIN RESULT   --  Gram positive cocci in pairs and chains*  Gram positive cocci in pairs and chains*       Last 24 Hours Medication List:   Current Facility-Administered Medications   Medication Dose Route Frequency Provider Last Rate    acetaminophen  650 mg Oral Q6H PRN Liliana Chao PA-C      amLODIPine  5 mg Oral Daily [place], [place]      ampicillin  2,000 mg Intravenous Q6H Bessy Franco MD 2,000 mg (09/07/21 0823)    apixaban  5 mg Oral BID Olu piedra PA-C      cefTRIAXone  2,000 mg Intravenous Q12H Bessy Franco MD 2,000 mg (09/07/21 6714)  [START ON 9/8/2021] furosemide  40 mg Intravenous Daily Rosalion Crawford PA-C      insulin glargine  45 Units Subcutaneous HS Ravi Villegas MD      insulin lispro  1-6 Units Subcutaneous TID AC [facility] [place]      insulin lispro  1-6 Units Subcutaneous HS [facility]      insulin lispro  33 Units Subcutaneous TID With Meals Ravi Villegas MD      metoprolol tartrate  12 5 mg Oral Q12H [facility] Vladimir Burns DO      ondansetron  4 mg Intravenous Q6H PRN [facility]      PARoxetine  20 mg Oral Daily [facility] [place], Trina Youngblood      potassium chloride  20 mEq Oral BID Rosalino Crawford PA-C      senna-docusate sodium  1 tablet Oral HS Erica Velasquez DO          Today, Patient Was Seen By: Maty Patel MD    ** Please Note: Dictation voice to text software may have been used in the creation of this document   **

## 2021-09-07 NOTE — ASSESSMENT & PLAN NOTE
Lab Results   Component Value Date    EGFR 39 09/07/2021    EGFR 29 09/05/2021    EGFR 26 09/04/2021    CREATININE 1 73 (H) 09/07/2021    CREATININE 2 21 (H) 09/05/2021    CREATININE 2 46 (H) 09/04/2021     · Creatinine on admission 2 49; baseline around 2 10  · Likely in setting of CHF volume overload   · Improving with diuresis

## 2021-09-07 NOTE — PROGRESS NOTES
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  Fortunately the organism is relatively sensitive  Consideration for the possibility of prosthetic valve endocarditis  Transthoracic echocardiogram with an apparent displaced stress at but no valvular vegetation reported or dehiscence  It is concerning the patient has developed congestive heart failure  No definitive sources appreciated although the patient did have dysuria while in the hospital previously and was apparently treated for UTI  Current urinalysis not consistent with UTI  CT abdomen pelvis nondiagnostic for source  -continue high-dose ampicillin and ceftriaxone  -check transesophageal echocardiogram  -consult cardiology  -recheck CBC with diff and CMP  -recheck blood cultures to confirm clearance of the bacteremia  -additional workup as needed     2  Acute congestive heart failure  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 with the same problem and apparently underwent coronary angiogram   His respiratory status is improved and he is not requiring any oxygen support  -consult cardiology  -diuresis  -check transesophageal echocardiogram as above  -additional workup as needed     3  Acute hypoxic respiratory failure-suspect all secondary to acute congestive heart failure  No clear clinical or radiographic pneumonia  Improved with diuresis     4  Acute kidney injury-complicating chronic kidney disease  Baseline creatinine around 2 1 and creatinine has bumped  Suspect primarily a pre renal issue  Consideration for the possibility of another etiology    -recheck BMP  -volume management  -dose adjusted antibiotics    Discussed the above management plan with the primary service    Possible transfer to [facility]    Antibiotics:  Ampicillin 2  Ceftriaxone 2  Antibiotics 5    Subjective:  Patient has no fever, chills, sweats; no nausea, vomiting, diarrhea; no cough, shortness of breath; no pain  No new symptoms  He is no longer requiring oxygen support  He is feeling a bit better  Objective:  Vitals:  Temp:  [97 6 °F (36 4 °C)-98 1 °F (36 7 °C)] 97 8 °F (36 6 °C)  HR:  [65-77] 77  Resp:  [17-20] 20  BP: (125-145)/(49-75) 145/63  SpO2:  [95 %-99 %] 99 %  Temp (24hrs), Av 8 °F (36 6 °C), Min:97 6 °F (36 4 °C), Max:98 1 °F (36 7 °C)  Current: Temperature: 97 8 °F (36 6 °C)    Physical Exam:   General Appearance:  Alert, interactive, nontoxic, no acute distress  Throat: Oropharynx moist without lesions  Lungs:   Clear to auscultation bilaterally; no wheezes, rhonchi or rales; respirations unlabored   Heart:  RRR; no murmur, rub or gallop   Abdomen:   Soft, non-tender, non-distended, positive bowel sounds  Extremities: No clubbing, cyanosis or edema   Skin: No new rashes or lesions  No draining wounds noted         Labs, Imaging, & Other studies:   All pertinent labs and imaging studies were personally reviewed  Results from last 7 days   Lab Units 21  0509 21  0537   WBC Thousand/uL 5 19 6 73 10 15   HEMOGLOBIN g/dL 9 3* 9 2* 9 3*   PLATELETS Thousands/uL 182 179 160     Results from last 7 days   Lab Units 21  0437 21  0509 21  0537 21  1741   SODIUM mmol/L 135* 133* 133* 136   POTASSIUM mmol/L 3 3* 3 2* 3 7 3 1*   CHLORIDE mmol/L 99* 96* 97* 97*   CO2 mmol/L 32 30 30 31   BUN mg/dL 40* 48* 44* 45*   CREATININE mg/dL 1 73* 2 21* 2 46* 2 49*   EGFR ml/min/1 73sq m 39 29 26 25   CALCIUM mg/dL 7 8* 7 8* 7 9* 8 4   AST U/L  --   --   --  34   ALT U/L  --   --   --  36   ALK PHOS U/L  --   --   --  84     Results from last 7 days   Lab Units 21  0437 21  0859 21  0768   BLOOD CULTURE  Received in Microbiology Lab  Culture in Progress  Received in Microbiology Lab  Culture in Progress  --  Enterococcus faecalis*  Enterococcus faecalis*   GRAM STAIN RESULT   --   --  Gram positive cocci in pairs and chains*  Gram positive cocci in pairs and chains*   MRSA CULTURE ONLY   --  No Methicillin Resistant Staphlyococcus aureus (MRSA) isolated  --      Results from last 7 days   Lab Units 09/05/21  0509 09/03/21  2337   PROCALCITONIN ng/ml 3 63* 2 25*        CT abdomen pelvis-patchy reticulonodular opacities  Cirrhotic morphology  Gallstones without pericholecystic inflammatory changes    Thickened bladder    Images personally reviewed by me in PACS

## 2021-09-07 NOTE — CONSULTS
Consultation - Cardiology   Echo Adkins 73 y o  male MRN: [id number]  Unit/Bed#: -01 Encounter: [id number]  09/07/21  12:14 PM    Assessment/ Plan:  1  Enterococcal bacteremia  2  Acute on chronic HFpEF  3  Moderate -> severe MS  4  AS s/p BioAVR (2008) with hemiarch reconstruction  5  Hx of bioprosthetic valve endocarditis s/p TAVR (2018)   6  Nonobstructive CAD  7  Hx of PE/DVT  8  Severe pulmonary hypertension  9  Hypertension  10  Hyperlipidemia    11  Uncontrolled DM   12  Hx of CVA 2017  13  FAHAD  14  Hypokalemia     Inpatient TTE showed EF 65%, no RWMAs, G2DD, mild biatrial dilation, severe mitral stenosis with markedly reduced leaflet separation, severely restricted mobility of anterior leaflet, transmitral gradient was 12mmHg, normal functioning aortic valve bioprosthesis the valve overall appeared well seated however there was suggestion at the valve strut extended into the LVOT and was impeding transmitral flow (RAISSA recommended for further analysis), trace MR, trace AR, valve mean gradient was 13mmHg, aortic valve obstructive index was 0 74, moderate TR, PASP 70mmHg, trace WA  In light of worsening mitral stenosis and bacteremia w/ history of AVR endocarditis and no CTS capability, we will discuss case with patients primary cardiologist Dr Josias Mansfield at [facility] for recommendations including in-house RAISSA vs transfer to [facility] for workup and possible intervention if necessary  Called office for Dr Josias Mansfield to call back  RAISSA cancelled for today  Patient agrees with plan of care  Will optimize his fluid status in the meantime due to acute CHF on admission     He appears closer to euvolemic today; he diuresed 5L since admission   Creatinine worsened and BMP shows now hypokalemia   Decrease to Lasix 40mg QD from BID dosing and order Kdur 20meq BID  Repeat BMP tomorrow AM  Continue Norvasc 5mg QD and Lopressor 12 5mg BID  Patient is intolerant of statin therapy and not previously amenable for PCSK9 inhibitors  Continue Eliquis 5mg BID for hx of PE/DVT  Not on ASA due to increased bleeding risk with Eliquis  Sepsis management as per ID/Primary team    History of Present Illness   Physician Requesting Consult: Mayela Day MD  Reason for Consult / Principal Problem: Abnormal TTE, r/o endocarditis  HPI: Dakota Saxena is a 73 y o  male with history as above who presented on 9/3/2021 with shortness of breath, cough, weakness, chills and fever since morning of admission  No recent illness or sick exposure  Fully vaccinated for COVID and was PCR negative on admission  CXR on arrival showed pulmonary infiltrates vs edema  Blood cultures were 2/2 positive for enterococcus bacteremia  ID was consulted and is now on cefepime and vancomycin  He was also started on Lasix 40mg BID for possible fluid overload and is now feeling much better  NTproBNP on admission was 3,286, serial troponins were negative  TTE was ordered to assess LVEF which showed possible worsening of known mitral stenosis from mod -> severe  Cardiology was consulted to facilitate RAISSA to further assess abnormal echocardiogram and rule our endocarditis  Patient currently denies any symptoms of chest pain, discomfort, palpitations, episodes of orthopnea, PND or LE edema  He does have <5lb weight fluctuations at home according to his diet and fluid intake but has been overall stable lately  He has been complaint with his home diuretics before his admission  He is currently visiting his daughter who lives locally and just "felt bad" with fatigue  He took his temperature morning of admission which was 101F and decided to come to the ED for further assessment  Patient does have complex cardiac history and follows with [place] [address] cardiologist Dr Yazan Miller  He has history of BioAVR and aortoplasty in 2008  He unfortunately developed BioAVR endocarditis in 2018 following a dental procedure which required TAVR placement   He has had serial echocardiograms most recently 7/2020 (per care everywhere) which showed normal TAVR function and moderate MS which appears to have worsened from his previous  Inpatient consult to Cardiology  Consult performed by: Maxwell Faulkner PA-C  Consult ordered by: Alma Liu MD      EKG: Normal sinus rhythm, nonspecific T-wave abnormalities    Review of Systems   Constitutional: Positive for fatigue and fever  Negative for appetite change, chills and diaphoresis  Respiratory: Positive for cough, chest tightness and shortness of breath  Cardiovascular: Negative for chest pain, palpitations and leg swelling  Gastrointestinal: Negative for diarrhea, nausea and vomiting  Endocrine: Negative for cold intolerance and heat intolerance  Genitourinary: Negative for difficulty urinating, dysuria and enuresis  Musculoskeletal: Negative for arthralgias, back pain and gait problem  Allergic/Immunologic: Negative for environmental allergies and food allergies  Neurological: Negative for dizziness, facial asymmetry and headaches  Hematological: Negative for adenopathy  Does not bruise/bleed easily  Psychiatric/Behavioral: Negative for agitation, behavioral problems and confusion  Historical Information   Past Medical History:   Diagnosis Date    [facility]congestive heart failure) ([facility] )     Diabetes mellitus ([facility] )     Hypertension      No past surgical history on file  Social History     Substance and Sexual Activity   Alcohol Use Not Currently     Social History     Substance and Sexual Activity   Drug Use Not on file     Social History     Tobacco Use   Smoking Status Former Smoker    Types: Cigarettes       Family History: No family history on file      Meds/Allergies   current meds:   Current Facility-Administered Medications   Medication Dose Route Frequency    acetaminophen (TYLENOL) tablet 650 mg  650 mg Oral Q6H PRN    amLODIPine (NORVASC) tablet 5 mg  5 mg Oral Daily    ampicillin (OMNIPEN) 2,000 mg in sodium chloride 0 9 % 100 mL IVPB  2,000 mg Intravenous Q6H    apixaban (ELIQUIS) tablet 5 mg  5 mg Oral BID    cefTRIAXone (ROCEPHIN) 2,000 mg in dextrose 5 % 50 mL IVPB  2,000 mg Intravenous Q12H    [START ON 2021] furosemide (LASIX) injection 40 mg  40 mg Intravenous Daily    insulin glargine (LANTUS) subcutaneous injection 45 Units 0 45 mL  45 Units Subcutaneous HS    insulin lispro (HumaLOG) 100 units/mL subcutaneous injection 1-6 Units  1-6 Units Subcutaneous TID AC    insulin lispro (HumaLOG) 100 units/mL subcutaneous injection 1-6 Units  1-6 Units Subcutaneous HS    insulin lispro (HumaLOG) 100 units/mL subcutaneous injection 30 Units  30 Units Subcutaneous TID With Meals    metoprolol tartrate (LOPRESSOR) partial tablet 12 5 mg  12 5 mg Oral Q12H ANGELA    ondansetron (ZOFRAN) injection 4 mg  4 mg Intravenous Q6H PRN    PARoxetine (PAXIL) tablet 20 mg  20 mg Oral Daily    potassium chloride (K-DUR,KLOR-CON) CR tablet 20 mEq  20 mEq Oral BID    senna-docusate sodium (SENOKOT S) 8 6-50 mg per tablet 1 tablet  1 tablet Oral HS     Allergies   Allergen Reactions    Nitroglycerin Other (See Comments)     Patient states "he is unsure of reaction"    Statins Other (See Comments)     Patient states "he is unsure of reaction"       Objective   Vitals: Blood pressure 145/63, pulse 77, temperature 97 8 °F (36 6 °C), resp  rate 20, height 5' 5" (1 651 m), weight 103 kg (227 lb 1 2 oz), SpO2 99 %  , Body mass index is 37 79 kg/m² ,   Orthostatic Blood Pressures      Most Recent Value   Blood Pressure  145/63 filed at 2021 0825   Patient Position - Orthostatic VS  Lying filed at 2021 5839          Systolic (54WIH), WHK:586 , Min:125 , ZAB:123     Diastolic (52TGY), MU, Min:49, Max:75        Intake/Output Summary (Last 24 hours) at 2021 1214  Last data filed at 2021 1113  Gross per 24 hour   Intake 520 ml   Output 2450 ml   Net -1930 ml       Invasive Devices     Peripheral Intravenous Line            Peripheral IV 09/06/21 Right;Ventral (anterior) Forearm <1 day                Physical Exam:  GEN: Alert and oriented x 3, in no acute distress  Well appearing and well nourished  HEENT: Sclera anicteric, conjunctivae pink, mucous membranes moist  Oropharynx clear  NECK: Supple, no carotid bruits, no significant JVD  Trachea midline, no thyromegaly  HEART: +1/3 systolic murmur at base, +6/3 diastolic murmur at apex  Regular rhythm, normal S1 and S2, no clicks, gallops or rubs  PMI nondisplaced, no thrills  LUNGS: Clear to auscultation bilaterally; no wheezes, rales, or rhonchi  No increased work of breathing or signs of respiratory distress  ABDOMEN: Soft, nontender, nondistended, normoactive bowel sounds  EXTREMITIES: Skin warm and well perfused, no clubbing, cyanosis, or edema  NEURO: No focal findings  Normal speech  Mood and affect normal    SKIN: Normal without suspicious lesions on exposed skin        Lab Results:     Troponins:   Results from last 7 days   Lab Units 09/03/21  1741   TROPONIN I ng/mL 0 03       CBC with diff:   Results from last 7 days   Lab Units 09/07/21  0437 09/05/21  0509 09/04/21  0537 09/03/21  1741   WBC Thousand/uL 5 19 6 73 10 15 9 61   HEMOGLOBIN g/dL 9 3* 9 2* 9 3* 9 8*   HEMATOCRIT % 28 8* 28 4* 28 3* 30 4*   MCV fL 89 89 89 89   PLATELETS Thousands/uL 182 179 160 182   MCH pg 28 7 28 7 29 3 28 7   MCHC g/dL 32 3 32 4 32 9 32 2   RDW % 13 6 13 6 13 5 13 4   MPV fL 9 0 9 2 8 7* 8 7*   NRBC AUTO /100 WBCs 0 0  --  0         CMP:   Results from last 7 days   Lab Units 09/07/21  0437 09/05/21  0509 09/04/21  0537 09/03/21  1741   POTASSIUM mmol/L 3 3* 3 2* 3 7 3 1*   CHLORIDE mmol/L 99* 96* 97* 97*   CO2 mmol/L 32 30 30 31   BUN mg/dL 40* 48* 44* 45*   CREATININE mg/dL 1 73* 2 21* 2 46* 2 49*   CALCIUM mg/dL 7 8* 7 8* 7 9* 8 4   AST U/L  --   --   --  34   ALT U/L  --   --   --  36   ALK PHOS U/L  --   --   --  84   EGFR ml/min/1 73sq m 39 29 26 25

## 2021-09-07 NOTE — ASSESSMENT & PLAN NOTE
Wt Readings from Last 3 Encounters:   09/07/21 103 kg (227 lb 1 2 oz)       · CXR BL lower lobe infiltrates and BNP 3286 suggestive of volume overload  Continue Lasix 40 IV b i d    Cardiology consult  · I/O's / daily weights    -4 5 L since admission  Pending cardiology consult

## 2021-09-07 NOTE — CASE MANAGEMENT
Case Management Assessment & Discharge Planning Note    Patient name Kelsea De Luna  Location [facility] [id number]/-[id number] MRN [id number]  : 1950 Date 2021       Current Admission Date: 9/3/2021  Current Admission Diagnosis:  Sepsis [facility])   Patient Active Problem List   Diagnosis    Anemia    Sepsis ([facility] )    [facility]    Acute CHF (congestive heart failure) ([facility] )    Hypertension    Acute kidney injury superimposed on chronic kidney disease ([facility] )    Hypokalemia    Acute respiratory failure ([facility] )    Previous Admission - Discharge Date:    LOS (days): 3  Geometric Mean LOS (GMLOS) (days): 4 80  Days to GMLOS:1 7 Previous Discharge Diagnosis:  No discharge information exists for this patient  Risk of Unplanned Readmission Score  Predictive Model Details          18 (Low)  Factor Value    Calculated 2021 12:08 15% Number of active Rx orders 20    Risk of Unplanned Readmission Model 10% ECG/EKG order present in last 6 months     10% Latest calcium low (7 8 mg/dL)     8% Latest BUN high (40 mg/dL)     8% Diagnosis of electrolyte disorder present     7% Imaging order present in last 6 months     6% Latest hemoglobin low (9 3 g/dL)     6% Number of ED visits in last six months 1     6% Age 73     5% Diagnosis of deficiency anemia present     5% Active anticoagulant Rx order present     5% Latest creatinine high (1 73 mg/dL)     4% Diagnosis of renal failure present     4% Charlson Comorbidity Index 3     3% Current length of stay 3 03 days         BUNDLE: Bundled Patient Payment:  (The pt is not a Bundle pt)    Reason for Referral:    OBJECTIVE:  Pt is a 73y o  year old /Civil [employer], white or  [1], male with Mormonism preference of Latter-day admitted on 3799  5:14 PM  Pt is admitted to [id number] at [address] [place] [address] with complaints of Sepsis ([facility] )     Current admission status: Inpatient       Preferred Pharmacy:   [address] [place] [address] [address] [place]  [address] [address]  Phone: [phone] Fax: [phone]    Primary Care Provider: No primary care provider on file  Primary Insurance: MEDICARE  Secondary Insurance: JANETTE    ASSESSMENT:  Active Health Care Agents    There are no active Health Care Agents on file                   [facility]: [place]    Readmission Root Cause  30 Day Readmission: No    Patient Information  Mental Status: Alert  During Assessment patient was accompanied by: Spouse, Daughter  Assessment information provided by[de-identified] Patient, Spouse, Daughter  Primary Caregiver: Self  Support Systems: Spouse/significant other, Daughter  What city do you live in?: [address]: Lives w/ Spouse/significant other  Is patient a ?: No    Activities of Daily Living Prior to Admission  Functional Status: Independent  Completes ADLs independently?: Yes  Ambulates independently?: Yes  Does patient use assisted devices?: No  Does patient currently own DME?: No  Does patient have a history of Outpatient Therapy (PT/OT)?: No  Does the patient have a history of Short-Term Rehab?: No  [facility]         Patient Information Continued  Income Source: Pension/care home  Does patient have prescription coverage?: Yes  Does patient receive dialysis treatments?: No  Does patient have a history of substance abuse?: No  Does patient have a history of Mental Health Diagnosis?: No    PHQ 2/9 Screening   Reviewed PHQ 2/9 Depression Screening Score?: Yes    Means of Transportation  Means of Transport to Appts[de-identified] Drives Self  In the past 12 months, has lack of transportation kept you from medical appointments or from getting medications?: No  In the past 12 months, has lack of transportation kept you from meetings, work, or from getting things needed for daily living?: No  Was application for public transport provided?: No (Not needed)    DISCHARGE DETAILS:    Discharge planning discussed with[de-identified] Pt wife and dtr, The pt resides in [facility] with his wife and they are just visiting their dtr who resides in [place]  The pt will return to [place] [facility] at dc if home  There is a discussion about the pt transfer to [facility] [address] per [place] Cancel who is waiting to hear from cardiology  NOTE The pt is on NEW O2    [place] of Choice: Yes    Contacts  Patient Contacts: wife-Maddie  Realtionship to Patient[de-identified] Family  Contact Method: Phone  Phone Number: [phone]  Reason/Outcome: Discharge [address]         Is the patient interested in [address] at discharge?: No    DME Referral Provided  Referral made for DME?: No                 Transportation at Discharge?: No

## 2021-09-08 VITALS
DIASTOLIC BLOOD PRESSURE: 68 MMHG | HEART RATE: 65 BPM | BODY MASS INDEX: 37.83 KG/M2 | OXYGEN SATURATION: 100 % | HEIGHT: 65 IN | SYSTOLIC BLOOD PRESSURE: 152 MMHG | RESPIRATION RATE: 18 BRPM | WEIGHT: 227.07 LBS | TEMPERATURE: 98.1 F

## 2021-09-08 LAB
ANION GAP SERPL CALCULATED.3IONS-SCNC: 5 MMOL/L (ref 4–13)
BASOPHILS # BLD AUTO: 0.06 THOUSANDS/ΜL (ref 0–0.1)
BASOPHILS NFR BLD AUTO: 1 % (ref 0–1)
BUN SERPL-MCNC: 34 MG/DL (ref 5–25)
CALCIUM SERPL-MCNC: 8.1 MG/DL (ref 8.3–10.1)
CHLORIDE SERPL-SCNC: 101 MMOL/L (ref 100–108)
CO2 SERPL-SCNC: 31 MMOL/L (ref 21–32)
CREAT SERPL-MCNC: 1.76 MG/DL (ref 0.6–1.3)
EOSINOPHIL # BLD AUTO: 0.25 THOUSAND/ΜL (ref 0–0.61)
EOSINOPHIL NFR BLD AUTO: 4 % (ref 0–6)
ERYTHROCYTE [DISTWIDTH] IN BLOOD BY AUTOMATED COUNT: 13.8 % (ref 11.6–15.1)
GFR SERPL CREATININE-BSD FRML MDRD: 39 ML/MIN/1.73SQ M
GLUCOSE SERPL-MCNC: 132 MG/DL (ref 65–140)
GLUCOSE SERPL-MCNC: 150 MG/DL (ref 65–140)
GLUCOSE SERPL-MCNC: 197 MG/DL (ref 65–140)
GLUCOSE SERPL-MCNC: 215 MG/DL (ref 65–140)
GLUCOSE SERPL-MCNC: 287 MG/DL (ref 65–140)
HCT VFR BLD AUTO: 27.1 % (ref 36.5–49.3)
HGB BLD-MCNC: 8.6 G/DL (ref 12–17)
IMM GRANULOCYTES # BLD AUTO: 0.08 THOUSAND/UL (ref 0–0.2)
IMM GRANULOCYTES NFR BLD AUTO: 1 % (ref 0–2)
LYMPHOCYTES # BLD AUTO: 0.61 THOUSANDS/ΜL (ref 0.6–4.47)
LYMPHOCYTES NFR BLD AUTO: 9 % (ref 14–44)
MCH RBC QN AUTO: 28.4 PG (ref 26.8–34.3)
MCHC RBC AUTO-ENTMCNC: 31.7 G/DL (ref 31.4–37.4)
MCV RBC AUTO: 89 FL (ref 82–98)
MONOCYTES # BLD AUTO: 0.84 THOUSAND/ΜL (ref 0.17–1.22)
MONOCYTES NFR BLD AUTO: 13 % (ref 4–12)
NEUTROPHILS # BLD AUTO: 4.75 THOUSANDS/ΜL (ref 1.85–7.62)
NEUTS SEG NFR BLD AUTO: 72 % (ref 43–75)
NRBC BLD AUTO-RTO: 0 /100 WBCS
PLATELET # BLD AUTO: 175 THOUSANDS/UL (ref 149–390)
PMV BLD AUTO: 8.8 FL (ref 8.9–12.7)
POTASSIUM SERPL-SCNC: 4 MMOL/L (ref 3.5–5.3)
RBC # BLD AUTO: 3.03 MILLION/UL (ref 3.88–5.62)
SARS-COV-2 RNA RESP QL NAA+PROBE: NEGATIVE
SODIUM SERPL-SCNC: 137 MMOL/L (ref 136–145)
WBC # BLD AUTO: 6.59 THOUSAND/UL (ref 4.31–10.16)

## 2021-09-08 PROCEDURE — NC001 PR NO CHARGE: Performed by: FAMILY MEDICINE

## 2021-09-08 PROCEDURE — 99233 SBSQ HOSP IP/OBS HIGH 50: CPT | Performed by: INTERNAL MEDICINE

## 2021-09-08 PROCEDURE — 82948 REAGENT STRIP/BLOOD GLUCOSE: CPT

## 2021-09-08 PROCEDURE — U0003 INFECTIOUS AGENT DETECTION BY NUCLEIC ACID (DNA OR RNA); SEVERE ACUTE RESPIRATORY SYNDROME CORONAVIRUS 2 (SARS-COV-2) (CORONAVIRUS DISEASE [COVID-19]), AMPLIFIED PROBE TECHNIQUE, MAKING USE OF HIGH THROUGHPUT TECHNOLOGIES AS DESCRIBED BY CMS-2020-01-R: HCPCS | Performed by: FAMILY MEDICINE

## 2021-09-08 PROCEDURE — 99232 SBSQ HOSP IP/OBS MODERATE 35: CPT | Performed by: INTERNAL MEDICINE

## 2021-09-08 PROCEDURE — U0005 INFEC AGEN DETEC AMPLI PROBE: HCPCS | Performed by: FAMILY MEDICINE

## 2021-09-08 PROCEDURE — 80048 BASIC METABOLIC PNL TOTAL CA: CPT | Performed by: FAMILY MEDICINE

## 2021-09-08 PROCEDURE — 85025 COMPLETE CBC W/AUTO DIFF WBC: CPT | Performed by: FAMILY MEDICINE

## 2021-09-08 PROCEDURE — 99239 HOSP IP/OBS DSCHRG MGMT >30: CPT | Performed by: FAMILY MEDICINE

## 2021-09-08 RX ORDER — ONDANSETRON 2 MG/ML
4 INJECTION INTRAMUSCULAR; INTRAVENOUS EVERY 6 HOURS PRN
Refills: 0
Start: 2021-09-08

## 2021-09-08 RX ORDER — POTASSIUM CHLORIDE 20 MEQ/1
20 TABLET, EXTENDED RELEASE ORAL 2 TIMES DAILY
Refills: 0
Start: 2021-09-09

## 2021-09-08 RX ORDER — INSULIN GLARGINE 100 [IU]/ML
45 INJECTION, SOLUTION SUBCUTANEOUS
Qty: 10 ML | Refills: 0
Start: 2021-09-09

## 2021-09-08 RX ORDER — FUROSEMIDE 10 MG/ML
40 INJECTION INTRAMUSCULAR; INTRAVENOUS DAILY
Qty: 4 ML | Refills: 0
Start: 2021-09-09

## 2021-09-08 RX ORDER — LOPERAMIDE HYDROCHLORIDE 2 MG/1
4 CAPSULE ORAL ONCE
Status: COMPLETED | OUTPATIENT
Start: 2021-09-08 | End: 2021-09-08

## 2021-09-08 RX ORDER — PAROXETINE 10 MG/1
20 TABLET, FILM COATED ORAL DAILY
Qty: 15 TABLET | Refills: 0
Start: 2021-09-08

## 2021-09-08 RX ORDER — AMOXICILLIN 250 MG
1 CAPSULE ORAL
Refills: 0
Start: 2021-09-09

## 2021-09-08 RX ADMIN — CEFTRIAXONE 2000 MG: 2 INJECTION, POWDER, FOR SOLUTION INTRAMUSCULAR; INTRAVENOUS at 01:30

## 2021-09-08 RX ADMIN — POTASSIUM CHLORIDE 20 MEQ: 1500 TABLET, EXTENDED RELEASE ORAL at 08:36

## 2021-09-08 RX ADMIN — AMPICILLIN SODIUM 2000 MG: 2 INJECTION, POWDER, FOR SOLUTION INTRAMUSCULAR; INTRAVENOUS at 14:40

## 2021-09-08 RX ADMIN — Medication 12.5 MG: at 20:01

## 2021-09-08 RX ADMIN — PAROXETINE 20 MG: 20 TABLET, FILM COATED ORAL at 08:36

## 2021-09-08 RX ADMIN — AMPICILLIN SODIUM 2000 MG: 2 INJECTION, POWDER, FOR SOLUTION INTRAMUSCULAR; INTRAVENOUS at 20:01

## 2021-09-08 RX ADMIN — CEFTRIAXONE 2000 MG: 2 INJECTION, POWDER, FOR SOLUTION INTRAMUSCULAR; INTRAVENOUS at 15:24

## 2021-09-08 RX ADMIN — AMPICILLIN SODIUM 2000 MG: 2 INJECTION, POWDER, FOR SOLUTION INTRAMUSCULAR; INTRAVENOUS at 02:09

## 2021-09-08 RX ADMIN — Medication 12.5 MG: at 08:36

## 2021-09-08 RX ADMIN — POTASSIUM CHLORIDE 20 MEQ: 1500 TABLET, EXTENDED RELEASE ORAL at 17:13

## 2021-09-08 RX ADMIN — INSULIN GLARGINE 45 UNITS: 100 INJECTION, SOLUTION SUBCUTANEOUS at 22:31

## 2021-09-08 RX ADMIN — AMPICILLIN SODIUM 2000 MG: 2 INJECTION, POWDER, FOR SOLUTION INTRAMUSCULAR; INTRAVENOUS at 08:45

## 2021-09-08 RX ADMIN — ACETAMINOPHEN 650 MG: 325 TABLET, FILM COATED ORAL at 03:21

## 2021-09-08 RX ADMIN — FUROSEMIDE 40 MG: 10 INJECTION, SOLUTION INTRAMUSCULAR; INTRAVENOUS at 08:36

## 2021-09-08 RX ADMIN — APIXABAN 5 MG: 5 TABLET, FILM COATED ORAL at 17:13

## 2021-09-08 RX ADMIN — AMLODIPINE BESYLATE 5 MG: 5 TABLET ORAL at 08:36

## 2021-09-08 RX ADMIN — APIXABAN 5 MG: 5 TABLET, FILM COATED ORAL at 08:36

## 2021-09-08 RX ADMIN — LOPERAMIDE HYDROCHLORIDE 4 MG: 2 CAPSULE ORAL at 20:01

## 2021-09-08 NOTE — ASSESSMENT & PLAN NOTE
Patient reporting development of cough, weakness, chills, dyspnea, decreased appetite, nausea without vomiting, and fever of 101F at home today  No recent sick contacts, patient is fully vaccinated against COVID  · Sepsis criteria; Fever 101F measured at home, resp 24cpm in ED; chills, rigors, and SOB at home plus CBC left shift suggestive of possible infectious lung process superimposed on volume overload  · CXR BL lower lobe infiltrates   · 96% O2 1L NC, /49; monitor  · Lactic acid 2 6-resolved  · Troponin WNL  · Procalcitonin 2 5  · Initially on cefepime and vancomycin  Switch tohigh-dose ampicillin and ceftriaxone on 09/06/2021 as per ID recommendation  · Blood cultures grow Enterococcus faecalis  · Pending repeat blood cultures  · ID following      Echo showed:  Ejection fraction of 37%, grade 2 diastolic dysfunction, severe mitral valve stenosis,The valve overall appeared well seated however there was the suggestion that the valve strut extended into the LVOT impeding transmitral flow  Cardiology discussed with patient's cardiologist at [facility] Dr Amy Love    [facility] is organizing transfer

## 2021-09-08 NOTE — ASSESSMENT & PLAN NOTE
Lab Results   Component Value Date    EGFR 39 09/08/2021    EGFR 39 09/07/2021    EGFR 29 09/05/2021    CREATININE 1 76 (H) 09/08/2021    CREATININE 1 73 (H) 09/07/2021    CREATININE 2 21 (H) 09/05/2021     · Creatinine on admission 2 49; baseline around 2 10  · Likely in setting of CHF volume overload   · Improving with diuresis

## 2021-09-08 NOTE — PROGRESS NOTES
Cardiology Progress Note   Herminio Acosta 73 y o  male MRN: [id number]    Unit/Bed#: -01 Encounter: [id number]      Assessment/Plan:  1  Enterococcal bacteremia  2  Acute on chronic HFpEF  3  Moderate -> severe MS  4  AS s/p BioAVR (2008) with hemiarch reconstruction  5  Hx of bioprosthetic valve endocarditis s/p TAVR (2018)   6  Nonobstructive CAD  7  Hx of PE/DVT  8  Severe pulmonary hypertension  9  Hypertension  10  Hyperlipidemia    11  Uncontrolled DM   12  Hx of CVA 2017  13  FAHAD     Inpatient TTE showed EF 65%, no RWMAs, G2DD, mild biatrial dilation, severe mitral stenosis with markedly reduced leaflet separation, severely restricted mobility of anterior leaflet, transmitral gradient was 12mmHg, normal functioning aortic valve bioprosthesis the valve overall appeared well seated however there was suggestion at the valve strut extended into the LVOT and was impeding transmitral flow (RAISSA recommended for further analysis), trace MR, trace AR, valve mean gradient was 13mmHg, aortic valve obstructive index was 0 74, moderate TR, PASP 70mmHg, trace ME      Discussed case with patients primary cardiologist Dr Merle Rodriguez at [place] [address] who will accept patient for transfer to [place] [address] for further workup and management  Patient and wife who was at bedside today are agreeable with plan of care      He appears closer to euvolemic today; he diuresed 5 5L since admission   Creatinine now stable; potassium WNL  Continue with Lasix 40mg QD and Kdur 20meq BID  Continue Norvasc 5mg QD and Lopressor 12 5mg BID  Patient is intolerant of statin therapy and not previously amenable for PCSK9 inhibitors  Continue Eliquis 5mg BID for hx of PE/DVT  Not on ASA due to increased bleeding risk with Eliquis  Sepsis management as per ID/Primary team    Subjective:   Patient seen and examined  No significant events overnight      Objective:     Vitals: Blood pressure 135/63, pulse 67, temperature 97 7 °F (36 5 °C), temperature source Oral, resp  rate 18, height 5' 5" (1 651 m), weight 103 kg (227 lb 1 2 oz), SpO2 97 %  , Body mass index is 37 79 kg/m² ,   Orthostatic Blood Pressures      Most Recent Value   Blood Pressure  135/63 filed at 09/08/2021 0700   Patient Position - Orthostatic VS  Lying filed at 09/08/2021 0700            Intake/Output Summary (Last 24 hours) at 9/8/2021 0854  Last data filed at 9/8/2021 0801  Gross per 24 hour   Intake 748 ml   Output 1725 ml   Net -977 ml         Physical Exam:    GEN: Toby Smith appears well, alert and oriented x 3, pleasant and cooperative   HEENT: pupils equal, round, and reactive to light; extraocular muscles intact  NECK: supple, no carotid bruits   HEART: +3/9 systolic murmur at base, +7/8 diastolic murmur at apex   regular rhythm, normal S1 and S2, no clicks, gallops or rubs   LUNGS: clear to auscultation bilaterally; no wheezes, rales, or rhonchi   ABDOMEN: normal bowel sounds, soft, no tenderness, no distention  EXTREMITIES: peripheral pulses normal; no clubbing, cyanosis, or edema      Medications:      Current Facility-Administered Medications:     acetaminophen (TYLENOL) tablet 650 mg, 650 mg, Oral, Q6H PRN, Nadia Bashir PA-C, 650 mg at 09/08/21 0321    amLODIPine (NORVASC) tablet 5 mg, 5 mg, Oral, Daily, Nadia Bashir PA-C, 5 mg at 09/08/21 0836    ampicillin (OMNIPEN) 2,000 mg in sodium chloride 0 9 % 100 mL IVPB, 2,000 mg, Intravenous, Q6H, Betsy Cuellar MD, Last Rate: 200 mL/hr at 09/08/21 0845, 2,000 mg at 09/08/21 0845    apixaban (ELIQUIS) tablet 5 mg, 5 mg, Oral, BID, Nadia Bashir PA-C, 5 mg at 09/08/21 0836    cefTRIAXone (ROCEPHIN) 2,000 mg in dextrose 5 % 50 mL IVPB, 2,000 mg, Intravenous, Q12H, Betsy Cuellar MD, Last Rate: 100 mL/hr at 09/08/21 0130, 2,000 mg at 09/08/21 0130    furosemide (LASIX) injection 40 mg, 40 mg, Intravenous, Daily, Rosalino Crawford PA-C, 40 mg at 09/08/21 0836    insulin glargine (LANTUS) subcutaneous injection 45 Units 0 45 mL, 45 Units, Subcutaneous, HS, Reynaldo Bradley MD, 45 Units at 09/07/21 2251    insulin lispro (HumaLOG) 100 units/mL subcutaneous injection 1-6 Units, 1-6 Units, Subcutaneous, HS, Ashwini Teixeira PA-C, 1 Units at 09/06/21 2119    insulin lispro (HumaLOG) 100 units/mL subcutaneous injection 1-6 Units, 1-6 Units, Subcutaneous, TID AC, 2 Units at 09/08/21 0800 **AND** Fingerstick Glucose (POCT), , , 4x Daily AC and at bedtime, Reynaldo Bradley MD    insulin lispro (HumaLOG) 100 units/mL subcutaneous injection 33 Units, 33 Units, Subcutaneous, TID With Meals, Reynalod Bradley MD, 33 Units at 09/08/21 0800    metoprolol tartrate (LOPRESSOR) partial tablet 12 5 mg, 12 5 mg, Oral, Q12H [address], Vladimir Latonyaai, DO, 12 5 mg at 09/08/21 0836    ondansetron (ZOFRAN) injection 4 mg, 4 mg, Intravenous, Q6H PRN, Lilo Richards PA-C    PARoxetine (PAXIL) tablet 20 mg, 20 mg, Oral, Daily, Ashwini Teixeira PA-C, 20 mg at 09/08/21 0836    potassium chloride (K-DUR,KLOR-CON) CR tablet 20 mEq, 20 mEq, Oral, BID, Rosalino Crawford PA-C, 20 mEq at 09/08/21 0836    senna-docusate sodium (SENOKOT S) 8 6-50 mg per tablet 1 tablet, 1 tablet, Oral, HS, Vladimir Burns DO, 1 tablet at 09/07/21 2251     Labs & Results:    Results from last 7 days   Lab Units 09/03/21  1741   TROPONIN I ng/mL 0 03     Results from last 7 days   Lab Units 09/08/21  0542 09/07/21  0437 09/05/21  0509   WBC Thousand/uL 6 59 5 19 6 73   HEMOGLOBIN g/dL 8 6* 9 3* 9 2*   HEMATOCRIT % 27 1* 28 8* 28 4*   PLATELETS Thousands/uL 175 182 179         Results from last 7 days   Lab Units 09/08/21  0542 09/07/21  0437 09/05/21  0509 09/03/21  1741   POTASSIUM mmol/L 4 0 3 3* 3 2* 3 1*   CHLORIDE mmol/L 101 99* 96* 97*   CO2 mmol/L 31 32 30 31   BUN mg/dL 34* 40* 48* 45*   CREATININE mg/dL 1 76* 1 73* 2 21* 2 49*   CALCIUM mg/dL 8 1* 7 8* 7 8* 8 4   ALK PHOS U/L  --   --   --  84   ALT U/L  --   --   --  36   AST U/L  --   --   --  34     Results from last 7 days   Lab Units 09/03/21  1741   INR  1 75*   PTT seconds 46*

## 2021-09-08 NOTE — ASSESSMENT & PLAN NOTE
No results found for: HGBA1C    Recent Labs     09/07/21  1610 09/07/21  2250 09/08/21  0626 09/08/21  1036   POCGLU 272* 136 215* 287*     (P) [phone]  · A1c 7 5 on 8/21  · Patient not prescribed outpatient diabetic medications  · Mealtime SSI ordered  · Monitor blood glucose   · Long-acting insulin increased to 45 units, continue Humalog 33 units with meals

## 2021-09-08 NOTE — PROGRESS NOTES
Progress Note - Infectious Disease   Carla Ramos 73 y o  male MRN: [id number]  Unit/Bed#: -01 Encounter: [id number]      Impression/Plan:  1  Enterococcal bacteremia-in the setting of a patient with a redo bioprosthetic aortic valve   Fortunately the organism is relatively sensitive   Consideration for the possibility of prosthetic valve endocarditis   Transthoracic echocardiogram with an apparent displaced stress at but no valvular vegetation reported or dehiscence   It is concerning the patient has developed congestive heart failure   No definitive sources appreciated although the patient did have dysuria while in the hospital previously and was apparently treated for UTI   Current urinalysis not consistent with UTI  CT abdomen pelvis nondiagnostic for source  Repeat blood cultures are negative thus far  -continue high-dose ampicillin and ceftriaxone  -check transesophageal echocardiogram after transfer to the [facility]  -consult cardiology  -recheck CBC with diff and CMP  -recheck blood cultures to confirm clearance of the bacteremia  -additional workup as needed     2  Acute congestive heart failure   In the setting of 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   The patient recently was admitted at the [facility] with the same problem and apparently underwent coronary angiogram   His respiratory status is improved and he is not requiring any oxygen support  -consult cardiology  -diuresis  -check transesophageal echocardiogram as above but will be deferred until transferred to [facility]  -additional workup as needed     3  Acute hypoxic respiratory failure-suspect all secondary to acute congestive heart failure   No clear clinical or radiographic pneumonia  Improved with diuresis but still requiring oxygen support     4  Acute kidney injury-complicating chronic kidney disease   Baseline creatinine around 2 1 and creatinine has bumped   Suspect primarily a pre renal issue   Consideration for the possibility of another etiology  The renal function has modestly improved and now stabilized  -recheck BMP  -volume management  -dose adjusted antibiotics    Have discussed the above management plan with the primary service    Await transfer to [facility]    Antibiotics:  Ampicillin 3  Ceftriaxone 3  Antibiotics 6    Subjective:  Patient has no fever, chills, sweats; no nausea, vomiting, diarrhea; no cough, shortness of breath; no pain  No new symptoms  He continues to require O2 by nasal cannula    Objective:  Vitals:  Temp:  [97 7 °F (36 5 °C)-98 7 °F (37 1 °C)] 97 7 °F (36 5 °C)  HR:  [67-71] 67  Resp:  [16-18] 18  BP: (115-135)/(52-63) 135/63  SpO2:  [94 %-99 %] 94 %  Temp (24hrs), Av 2 °F (36 8 °C), Min:97 7 °F (36 5 °C), Max:98 7 °F (37 1 °C)  Current: Temperature: 97 7 °F (36 5 °C)    Physical Exam:   General Appearance:  Alert, interactive, nontoxic, no acute distress  Throat: Oropharynx moist without lesions  Lungs:   Decreased breath sounds bilaterally; no wheezes, rhonchi or rales; respirations unlabored   Heart:  RRR; systolic murmur, rub or gallop   Abdomen:   Soft, non-tender, non-distended, positive bowel sounds  Extremities: No clubbing, cyanosis or edema   Skin: No new rashes or lesions  No draining wounds noted         Labs, Imaging, & Other studies:   All pertinent labs and imaging studies were personally reviewed  Results from last 7 days   Lab Units 21  0521  0509   WBC Thousand/uL 6 59 5 19 6 73   HEMOGLOBIN g/dL 8 6* 9 3* 9 2*   PLATELETS Thousands/uL 175 182 179     Results from last 7 days   Lab Units 21  0542 21  0437 21  0509 21  1741   SODIUM mmol/L 137 135* 133* 136   POTASSIUM mmol/L 4 0 3 3* 3 2* 3 1*   CHLORIDE mmol/L 101 99* 96* 97*   CO2 mmol/L 31 32 30 31   BUN mg/dL 34* 40* 48* 45*   CREATININE mg/dL 1 76* 1 73* 2 21* 2 49*   EGFR ml/min/1 73sq m 39 39 29 25   CALCIUM mg/dL 8 1* 7 8* 7 8* 8 4   AST U/L  --   --   --  34   ALT U/L  --   --   --  36   ALK PHOS U/L  --   --   --  84     Results from last 7 days   Lab Units 09/07/21  0437 09/04/21  0859 09/03/21  1741   BLOOD CULTURE  No Growth at 24 hrs    No Growth at 24 hrs   --  Enterococcus faecalis*  Enterococcus faecalis*   GRAM STAIN RESULT   --   --  Gram positive cocci in pairs and chains*  Gram positive cocci in pairs and chains*   MRSA CULTURE ONLY   --  No Methicillin Resistant Staphlyococcus aureus (MRSA) isolated  --      Results from last 7 days   Lab Units 09/05/21  0509 09/03/21  2337   PROCALCITONIN ng/ml 3 63* 2 25*

## 2021-09-08 NOTE — PROGRESS NOTES
[facility]  Progress Note Ming Mt 1950, 73 y o  male MRN: [id number]  Unit/Bed#: MS Meghan Encounter: [id number]  Primary Care Provider: No primary care provider on file  Date and time admitted to hospital: 9/3/2021  5:14 PM    * Sepsis [facility])  Assessment & Plan  Patient reporting development of cough, weakness, chills, dyspnea, decreased appetite, nausea without vomiting, and fever of 101F at home today  No recent sick contacts, patient is fully vaccinated against COVID  · Sepsis criteria; Fever 101F measured at home, resp 24cpm in ED; chills, rigors, and SOB at home plus CBC left shift suggestive of possible infectious lung process superimposed on volume overload  · CXR BL lower lobe infiltrates   · 96% O2 1L NC, /49; monitor  · Lactic acid 2 6-resolved  · Troponin WNL  · Procalcitonin 2 5  · Initially on cefepime and vancomycin  Switch tohigh-dose ampicillin and ceftriaxone on 09/06/2021 as per ID recommendation  · Blood cultures grow Enterococcus faecalis  · Pending repeat blood cultures  · ID following      Echo showed:  Ejection fraction of 84%, grade 2 diastolic dysfunction, severe mitral valve stenosis,The valve overall appeared well seated however there was the suggestion that the valve strut extended into the LVOT impeding transmitral flow  Cardiology discussed with patient's cardiologist at [place] [address] Dr Cedrick Dhillon  [facility] is organizing transfer      Acute respiratory failure [facility])  Assessment & Plan  Secondary to CHF and pneumonia  O2 p r n   Keep sats above 92%    Hypokalemia  Assessment & Plan  · Replete prn    Acute kidney injury superimposed on chronic kidney disease [facility])  Assessment & Plan  Lab Results   Component Value Date    EGFR 39 09/08/2021    EGFR 39 09/07/2021    EGFR 29 09/05/2021    CREATININE 1 76 (H) 09/08/2021    CREATININE 1 73 (H) 09/07/2021    CREATININE 2 21 (H) 09/05/2021     · Creatinine on admission 2 49; baseline around 2 10  · Likely in setting of CHF volume overload   · Improving with diuresis        Hypertension  Assessment & Plan  · Relatively well controlled  · Continue home HTN medications      Acute CHF (congestive heart failure) ([address] )  Assessment & Plan  Wt Readings from Last 3 Encounters:   09/08/21 103 kg (227 lb 1 2 oz)       · CXR BL lower lobe infiltrates and BNP 3286 suggestive of volume overload  Continue Lasix 40 IV b i d  Cardiology consult  · I/O's / daily weights    -5 1 L since admission  Pending cardiology consult    Type 2 diabetes mellitus [facility])  Assessment & Plan  No results found for: HGBA1C    Recent Labs     09/07/21  1610 09/07/21  2250 09/08/21  0626 09/08/21  1036   POCGLU 272* 136 215* 287*     (P) 279 [phone]  · A1c 7 5 on 8/21  · Patient not prescribed outpatient diabetic medications  · Mealtime SSI ordered  · Monitor blood glucose   · Long-acting insulin increased to 45 units, continue Humalog 33 units with meals          Anemia  Assessment & Plan  Stable  No signs of bleeding  Continue to monitor            VTE Pharmacologic Prophylaxis:   Pharmacologic: Apixaban (Eliquis)  Mechanical VTE Prophylaxis in Place: Yes    Patient Centered Rounds: I have performed bedside rounds with nursing staff today  Discussions with Specialists or Other Care Team Provider:  Cardiology    Education and Discussions with Family / Patient:  With patient, patient did not want me to call any family member    Time Spent for Care: 45 minutes  More than 50% of total time spent on counseling and coordination of care as described above  Current Length of Stay: 4 day(s)    Current Patient Status: Inpatient   Certification Statement: The patient will continue to require additional inpatient hospital stay due to Pending transfer to [facility]    Discharge Plan:  Pending transfer to [facility]    Code Status: Level 1 - Full Code      Subjective:   Patient seen examined bedside    No acute events denies any chest pain, shortness of breath, abdominal pain    Objective:     Vitals:   Temp (24hrs), Av 2 °F (36 8 °C), Min:97 7 °F (36 5 °C), Max:98 7 °F (37 1 °C)    Temp:  [97 7 °F (36 5 °C)-98 7 °F (37 1 °C)] 97 7 °F (36 5 °C)  HR:  [67-71] 67  Resp:  [16-18] 18  BP: (115-135)/(52-63) 135/63  SpO2:  [97 %-99 %] 97 %  Body mass index is 37 79 kg/m²  Input and Output Summary (last 24 hours): Intake/Output Summary (Last 24 hours) at 2021 1122  Last data filed at 2021 1038  Gross per 24 hour   Intake 748 ml   Output 1500 ml   Net -752 ml       Physical Exam:     Physical Exam  Vitals and nursing note reviewed  Constitutional:       General: He is not in acute distress  Appearance: Normal appearance  He is obese  HENT:      Head: Normocephalic  Nose: Nose normal       Mouth/Throat:      Mouth: Mucous membranes are moist    Eyes:      Conjunctiva/sclera: Conjunctivae normal    Cardiovascular:      Rate and Rhythm: Normal rate  Heart sounds: Murmur heard  Pulmonary:      Effort: Pulmonary effort is normal  No respiratory distress  Breath sounds: Normal breath sounds  No wheezing  Comments: Decreased breath sounds at bases  Abdominal:      General: There is no distension  Tenderness: There is no abdominal tenderness  There is no guarding  Musculoskeletal:         General: No swelling  Right lower leg: Edema present  Skin:     General: Skin is warm  Neurological:      General: No focal deficit present  Mental Status: He is alert and oriented to person, place, and time     Psychiatric:         Mood and Affect: Mood normal            Additional Data:     Labs:    Results from last 7 days   Lab Units 21  0542 21  0537   WBC Thousand/uL 6 59 10 15   HEMOGLOBIN g/dL 8 6* 9 3*   HEMATOCRIT % 27 1* 28 3*   PLATELETS Thousands/uL 175 160   BANDS PCT %  --  2   NEUTROS PCT % 72  --    LYMPHS PCT % 9*  --    LYMPHO PCT %  --  3*   MONOS PCT % 13*  --    MONO PCT %  --  5   EOS PCT % 4 0 Results from last 7 days   Lab Units 09/08/21  0542 09/03/21  1741   SODIUM mmol/L 137 136   POTASSIUM mmol/L 4 0 3 1*   CHLORIDE mmol/L 101 97*   CO2 mmol/L 31 31   BUN mg/dL 34* 45*   CREATININE mg/dL 1 76* 2 49*   ANION GAP mmol/L 5 8   CALCIUM mg/dL 8 1* 8 4   ALBUMIN g/dL  --  2 6*   TOTAL BILIRUBIN mg/dL  --  0 51   ALK PHOS U/L  --  84   ALT U/L  --  36   AST U/L  --  34   GLUCOSE RANDOM mg/dL 197* 142*     Results from last 7 days   Lab Units 09/03/21  1741   INR  1 75*     Results from last 7 days   Lab Units 09/08/21  1036 09/08/21  0626 09/07/21  2250 09/07/21  1610 09/07/21  1213 09/07/21  0622 09/06/21  2118 09/06/21  1605 09/06/21  1103 09/05/21  2155 09/05/21  1552 09/05/21  1037   POC GLUCOSE mg/dl 287* 215* 136 272* 296* 183* 181* 289* 268* 277* 319* 343*         Results from last 7 days   Lab Units 09/05/21  0509 09/03/21  2337 09/03/21 2015 09/03/21  1741   LACTIC ACID mmol/L  --   --  1 4 2 6*   PROCALCITONIN ng/ml 3 63* 2 25*  --   --            * I Have Reviewed All Lab Data Listed Above  * Additional Pertinent Lab Tests Reviewed: [place] [facility] Admission Reviewed    Imaging:    Imaging Reports Reviewed Today Include:  None  Imaging Personally Reviewed by Myself Includes:  None    Recent Cultures (last 7 days):     Results from last 7 days   Lab Units 09/07/21  0437 09/03/21  1741   BLOOD CULTURE  No Growth at 24 hrs  No Growth at 24 hrs   Enterococcus faecalis*  Enterococcus faecalis*   GRAM STAIN RESULT   --  Gram positive cocci in pairs and chains*  Gram positive cocci in pairs and chains*       Last 24 Hours Medication List:   Current Facility-Administered Medications   Medication Dose Route Frequency Provider Last Rate    acetaminophen  650 mg Oral Q6H PRN Rhae Adithya, PA-MALENA      amLODIPine  5 mg Oral Daily Estelle Frazier, [place]      ampicillin  2,000 mg Intravenous Q6H Gertrudis Neri MD 2,000 mg (09/08/21 0845)    apixaban  5 mg Oral BID Preston Haji, SUDHAKAR      cefTRIAXone  2,000 mg Intravenous Q12H Cleopatra Alvarado MD 2,000 mg (09/08/21 0130)    furosemide  40 mg Intravenous Daily Rosalino Crawford PA-C      insulin glargine  45 Units Subcutaneous HS Karitk Castro MD      insulin lispro  1-6 Units Subcutaneous HS Juventino Richards PA-C      insulin lispro  1-6 Units Subcutaneous TID AC Kartik Castro MD      insulin lispro  33 Units Subcutaneous TID With Meals Kartik Castro MD      metoprolol tartrate  12 5 mg Oral Q12H [facility]       ondansetron  4 mg Intravenous Q6H PRN Juventino [facility] CenterSUDHAKAR      PARoxetine  20 mg Oral Daily [place], [place]      potassium chloride  20 mEq Oral BID Rosalino Crawford PA-C      senna-docusate sodium  1 tablet Oral HS Bhavesh Cohen DO          Today, Patient Was Seen By: Lynette Burnett MD    ** Please Note: Dictation voice to text software may have been used in the creation of this document   **

## 2021-09-08 NOTE — ASSESSMENT & PLAN NOTE
Wt Readings from Last 3 Encounters:   09/08/21 103 kg (227 lb 1 2 oz)       · CXR BL lower lobe infiltrates and BNP 3286 suggestive of volume overload  Continue Lasix 40 IV b i d    Cardiology consult  · I/O's / daily weights    -5 1 L since admission  Pending cardiology consult

## 2021-09-09 NOTE — INCIDENTAL FINDINGS
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

## 2021-09-09 NOTE — DISCHARGE SUMMARY
[address] [facility]  Discharge- Joseph Silva 1950, 73 y o  male MRN: [id number]  Unit/Bed#: MS Meghan Encounter: [id number]  Primary Care Provider: No primary care provider on file  Date and time admitted to hospital: 9/3/2021  5:14 PM    * Sepsis [facility])  Assessment & Plan  Patient reporting development of cough, weakness, chills, dyspnea, decreased appetite, nausea without vomiting, and fever of 101F at home today  No recent sick contacts, patient is fully vaccinated against COVID  · Sepsis criteria; Fever 101F measured at home, resp 24cpm in ED; chills, rigors, and SOB at home plus CBC left shift suggestive of possible infectious lung process superimposed on volume overload  · CXR BL lower lobe infiltrates   · 96% O2 1L NC, /49; monitor  · Lactic acid 2 6-resolved  · Troponin WNL  · Procalcitonin 2 5  · Initially on cefepime and vancomycin  Switch tohigh-dose ampicillin and ceftriaxone on 09/06/2021 as per ID recommendation  · Blood cultures grow Enterococcus faecalis  · Pending repeat blood cultures  · ID following      Echo showed:  Ejection fraction of 82%, grade 2 diastolic dysfunction, severe mitral valve stenosis,The valve overall appeared well seated however there was the suggestion that the valve strut extended into the LVOT impeding transmitral flow  Cardiology discussed with patient's cardiologist at [facility] Dr Winter Adhikari is organizing transfer      Acute respiratory failure [facility])  Assessment & Plan  Secondary to CHF and pneumonia  O2 p r n   Keep sats above 92%    Hypokalemia  Assessment & Plan  · Replete prn    Acute kidney injury superimposed on chronic kidney disease [facility])  Assessment & Plan  Lab Results   Component Value Date    EGFR 39 09/08/2021    EGFR 39 09/07/2021    EGFR 29 09/05/2021    CREATININE 1 76 (H) 09/08/2021    CREATININE 1 73 (H) 09/07/2021    CREATININE 2 21 (H) 09/05/2021     · Creatinine on admission 2 49; baseline around 2 10  · Likely in setting of CHF volume overload   · Improving with diuresis        Hypertension  Assessment & Plan  · Relatively well controlled  · Continue home HTN medications      Acute CHF (congestive heart failure) ([facility] )  Assessment & Plan  Wt Readings from Last 3 Encounters:   09/08/21 103 kg (227 lb 1 2 oz)       · CXR BL lower lobe infiltrates and BNP 3286 suggestive of volume overload  Continue Lasix 40 IV b i d  Cardiology consult  · I/O's / daily weights    -5 1 L since admission  Pending cardiology consult    Type 2 diabetes mellitus ([facility] )  Assessment & Plan  No results found for: HGBA1C    Recent Labs     09/08/21  0626 09/08/21  1036 09/08/21  1521 09/08/21  2040   POCGLU 215* 287* 150* 132     (P) 219  · A1c 7 5 on 8/21  · Patient not prescribed outpatient diabetic medications  · Mealtime SSI ordered  · Monitor blood glucose   · Long-acting insulin increased to 45 units, continue Humalog 33 units with meals          Anemia  Assessment & Plan  Stable  No signs of bleeding  Continue to monitor          Discharging Physician / Practitioner: Jaida Landrum MD  PCP: No primary care provider on file  Admission Date:   Admission Orders (From admission, onward)     Ordered        09/04/21 1125  Inpatient Admission  Once         09/03/21 1957  Place in Observation  Once                   Discharge Date: 09/08/21    Medical Problems     Resolved Problems  Date Reviewed: 9/8/2021    None                Consultations During Hospital Stay:  · Radiology, pharmacy, infectious disease    Procedures Performed:   · Not    Significant Findings / Test Results:   · Is a    Incidental Findings:   · none     Test Results Pending at Discharge (will require follow up):    · None     Outpatient Tests Requested:  · none    Complications:  None    Reason for Admission:cough, difficulty breathing, fever, chills, and fatigue at home today    Hospital Course:     Hudson Pfeiffer is a 73 y o  male patient who originally presented to the hospital on 9/3/2021 due to cough and fatigue     Patient met sepsis criteria and was started on broad-spectrum antibiotic  Was also found to be in CHF  Will treated with IV Lasix  Blood cultures grew enterococcal bacteremia  Transthoracic echocardiogram with an apparent displaced stress at but no valvular vegetation reported or dehiscence  Cardiology was consulted  Patient was previously treated with [facility]  Decision was made to transfer patient to [facility]  Patient has been treated with obese and ceftriaxone  Please see above list of diagnoses and related plan for additional information  Condition at Discharge: good     Discharge Day Visit / Exam:     * Please refer to separate progress note for these details *    Discussion with Family:  With patient    Discharge instructions/Information to patient and family:   See after visit summary for information provided to patient and family  Provisions for Follow-Up Care:  See after visit summary for information related to follow-up care and any pertinent home health orders  Disposition:     [address] Transfer to [address] [place] [address] to [facility]:   · Not Applicable to this Patient - Not Applicable to this Patient    Planned Readmission:  No     Discharge Statement:  I spent 35 minutes discharging the patient  This time was spent on the day of discharge  I had direct contact with the patient on the day of discharge  Greater than 50% of the total time was spent examining patient, answering all patient questions, arranging and discussing plan of care with patient as well as directly providing post-discharge instructions  Additional time then spent on discharge activities  Discharge Medications:  See after visit summary for reconciled discharge medications provided to patient and family        ** Please Note: This note has been constructed using a voice recognition system **

## 2021-09-09 NOTE — ASSESSMENT & PLAN NOTE
Lab Results   Component Value Date    EGFR 39 09/08/2021    EGFR 39 09/07/2021    EGFR 29 09/05/2021    CREATININE 1 76 (H) 09/08/2021    CREATININE 1 73 (H) 09/07/2021    CREATININE 2 21 (H) 09/05/2021     · Creatinine on admission 2 49; baseline around 2 10  · Likely in setting of CHF volume overload   · Improving with diuresis no

## 2021-09-09 NOTE — DISCHARGE INSTR - AVS FIRST PAGE
Per [facility] ID recommends TTE after transfer to [facility]       Also recommend cardiology consult upon arrival to [facility]    Continue antibiotics per [facility] ID team  Refer to chart for details  As per Dr Coralee Ormond:   "continue high-dose ampicillin and ceftriaxone  -check transesophageal echocardiogram after transfer to the [facility]  -consult cardiology  -recheck CBC with diff and CMP  -recheck blood cultures to confirm clearance of the bacteremia  consult cardiology  -diuresis  -check transesophageal echocardiogram as above but will be deferred until transferred to [facility]

## 2021-09-09 NOTE — ASSESSMENT & PLAN NOTE
No results found for: HGBA1C    Recent Labs     09/08/21  0626 09/08/21  1036 09/08/21  1521 09/08/21  2040   POCGLU 215* 287* 150* 132     (P) 219  · A1c 7 5 on 8/21  · Patient not prescribed outpatient diabetic medications  · Mealtime SSI ordered  · Monitor blood glucose   · Long-acting insulin increased to 45 units, continue Humalog 33 units with meals

## 2021-09-12 LAB
BACTERIA BLD CULT: NORMAL
BACTERIA BLD CULT: NORMAL